# Patient Record
Sex: MALE | Race: WHITE | NOT HISPANIC OR LATINO | Employment: OTHER | URBAN - METROPOLITAN AREA
[De-identification: names, ages, dates, MRNs, and addresses within clinical notes are randomized per-mention and may not be internally consistent; named-entity substitution may affect disease eponyms.]

---

## 2017-03-24 RX ORDER — ATORVASTATIN CALCIUM 20 MG/1
TABLET, FILM COATED ORAL
Qty: 90 TABLET | Refills: 0 | Status: SHIPPED | OUTPATIENT
Start: 2017-03-24 | End: 2017-07-15 | Stop reason: SDUPTHER

## 2017-04-25 DIAGNOSIS — E78.5 HYPERLIPIDEMIA, UNSPECIFIED HYPERLIPIDEMIA TYPE: Primary | ICD-10-CM

## 2017-04-25 DIAGNOSIS — R73.9 HYPERGLYCEMIA: ICD-10-CM

## 2017-05-01 LAB
ALBUMIN SERPL-MCNC: 4.4 G/DL (ref 3.5–5.2)
ALBUMIN/GLOB SERPL: 1.7 G/DL
ALP SERPL-CCNC: 132 U/L (ref 39–117)
ALT SERPL-CCNC: 23 U/L (ref 1–41)
AST SERPL-CCNC: 20 U/L (ref 1–40)
BILIRUB SERPL-MCNC: 0.4 MG/DL (ref 0.1–1.2)
BUN SERPL-MCNC: 15 MG/DL (ref 6–20)
BUN/CREAT SERPL: 15 (ref 7–25)
CALCIUM SERPL-MCNC: 9.7 MG/DL (ref 8.6–10.5)
CHLORIDE SERPL-SCNC: 101 MMOL/L (ref 98–107)
CHOLEST SERPL-MCNC: 137 MG/DL (ref 0–200)
CO2 SERPL-SCNC: 24.4 MMOL/L (ref 22–29)
CREAT SERPL-MCNC: 1 MG/DL (ref 0.76–1.27)
GLOBULIN SER CALC-MCNC: 2.6 GM/DL
GLUCOSE SERPL-MCNC: 153 MG/DL (ref 65–99)
HBA1C MFR BLD: 5.44 % (ref 4.8–5.6)
HDLC SERPL-MCNC: 38 MG/DL (ref 40–60)
LDLC SERPL CALC-MCNC: 70 MG/DL (ref 0–100)
LDLC/HDLC SERPL: 1.83 {RATIO}
POTASSIUM SERPL-SCNC: 4.5 MMOL/L (ref 3.5–5.2)
PROT SERPL-MCNC: 7 G/DL (ref 6–8.5)
SODIUM SERPL-SCNC: 140 MMOL/L (ref 136–145)
TRIGL SERPL-MCNC: 147 MG/DL (ref 0–150)
VLDLC SERPL CALC-MCNC: 29.4 MG/DL (ref 5–40)

## 2017-05-08 ENCOUNTER — OFFICE VISIT (OUTPATIENT)
Dept: FAMILY MEDICINE CLINIC | Facility: CLINIC | Age: 57
End: 2017-05-08

## 2017-05-08 VITALS
OXYGEN SATURATION: 99 % | BODY MASS INDEX: 27.2 KG/M2 | WEIGHT: 190 LBS | DIASTOLIC BLOOD PRESSURE: 68 MMHG | TEMPERATURE: 98.2 F | SYSTOLIC BLOOD PRESSURE: 112 MMHG | HEIGHT: 70 IN | HEART RATE: 61 BPM

## 2017-05-08 DIAGNOSIS — E78.5 HYPERLIPIDEMIA, UNSPECIFIED HYPERLIPIDEMIA TYPE: Primary | ICD-10-CM

## 2017-05-08 PROCEDURE — 99213 OFFICE O/P EST LOW 20 MIN: CPT | Performed by: INTERNAL MEDICINE

## 2017-07-17 RX ORDER — ATORVASTATIN CALCIUM 20 MG/1
TABLET, FILM COATED ORAL
Qty: 90 TABLET | Refills: 0 | Status: SHIPPED | OUTPATIENT
Start: 2017-07-17 | End: 2017-10-20 | Stop reason: SDUPTHER

## 2017-10-20 RX ORDER — ATORVASTATIN CALCIUM 20 MG/1
TABLET, FILM COATED ORAL
Qty: 90 TABLET | Refills: 0 | Status: SHIPPED | OUTPATIENT
Start: 2017-10-20 | End: 2018-05-08 | Stop reason: SDUPTHER

## 2017-11-01 DIAGNOSIS — E78.5 HYPERLIPIDEMIA, UNSPECIFIED HYPERLIPIDEMIA TYPE: Primary | ICD-10-CM

## 2017-11-06 ENCOUNTER — TELEPHONE (OUTPATIENT)
Dept: FAMILY MEDICINE CLINIC | Facility: CLINIC | Age: 57
End: 2017-11-06

## 2017-11-06 NOTE — TELEPHONE ENCOUNTER
SPOKE TO PATIENT AND INFORMED HIM THAT THE LAB WORK COULD NOT BE ADDED SINCE I DID NOT HAVE A DIAGNOSIS, UT THAT WE COULD ADD IT TO HIS BLOOD WORK ONCE HE IS SEEN AND DR. GONZALES WANTS TO ORDER IT.  HE EXPRESSED UNDERSTANDING   ----- Message from Kike Elias sent at 11/3/2017  3:06 PM EDT -----  PT IS SCHEDULED FOR LABS ON 11/7/17 AND IS REQUESTING THE  ADD HORMONE LEVEL CHECK TO HIS LAB ORDER.  PLEASE ADD TO ORDER OR CONTACT PT TO ADVISE [phone]

## 2017-11-07 LAB
ALBUMIN SERPL-MCNC: 4.6 G/DL (ref 3.5–5.2)
ALBUMIN/GLOB SERPL: 1.8 G/DL
ALP SERPL-CCNC: 119 U/L (ref 39–117)
ALT SERPL-CCNC: 30 U/L (ref 1–41)
AST SERPL-CCNC: 28 U/L (ref 1–40)
BILIRUB SERPL-MCNC: 0.5 MG/DL (ref 0.1–1.2)
BUN SERPL-MCNC: 14 MG/DL (ref 6–20)
BUN/CREAT SERPL: 13 (ref 7–25)
CALCIUM SERPL-MCNC: 9.6 MG/DL (ref 8.6–10.5)
CHLORIDE SERPL-SCNC: 102 MMOL/L (ref 98–107)
CHOLEST SERPL-MCNC: 147 MG/DL (ref 0–200)
CO2 SERPL-SCNC: 27.3 MMOL/L (ref 22–29)
CREAT SERPL-MCNC: 1.08 MG/DL (ref 0.76–1.27)
GFR SERPLBLD CREATININE-BSD FMLA CKD-EPI: 70 ML/MIN/1.73
GFR SERPLBLD CREATININE-BSD FMLA CKD-EPI: 85 ML/MIN/1.73
GLOBULIN SER CALC-MCNC: 2.6 GM/DL
GLUCOSE SERPL-MCNC: 107 MG/DL (ref 65–99)
HDLC SERPL-MCNC: 36 MG/DL (ref 40–60)
LDLC SERPL CALC-MCNC: 67 MG/DL (ref 0–100)
LDLC/HDLC SERPL: 1.85 {RATIO}
POTASSIUM SERPL-SCNC: 4.6 MMOL/L (ref 3.5–5.2)
PROT SERPL-MCNC: 7.2 G/DL (ref 6–8.5)
SODIUM SERPL-SCNC: 142 MMOL/L (ref 136–145)
TRIGL SERPL-MCNC: 222 MG/DL (ref 0–150)
VLDLC SERPL CALC-MCNC: 44.4 MG/DL (ref 5–40)

## 2017-11-14 ENCOUNTER — OFFICE VISIT (OUTPATIENT)
Dept: FAMILY MEDICINE CLINIC | Facility: CLINIC | Age: 57
End: 2017-11-14

## 2017-11-14 VITALS
DIASTOLIC BLOOD PRESSURE: 70 MMHG | HEART RATE: 62 BPM | BODY MASS INDEX: 27.35 KG/M2 | SYSTOLIC BLOOD PRESSURE: 120 MMHG | TEMPERATURE: 97.9 F | WEIGHT: 191 LBS | HEIGHT: 70 IN | OXYGEN SATURATION: 99 %

## 2017-11-14 DIAGNOSIS — E78.5 HYPERLIPIDEMIA, UNSPECIFIED HYPERLIPIDEMIA TYPE: ICD-10-CM

## 2017-11-14 DIAGNOSIS — M77.11 LATERAL EPICONDYLITIS OF BOTH ELBOWS: ICD-10-CM

## 2017-11-14 DIAGNOSIS — M77.12 LATERAL EPICONDYLITIS OF BOTH ELBOWS: ICD-10-CM

## 2017-11-14 DIAGNOSIS — R73.9 HYPERGLYCEMIA: ICD-10-CM

## 2017-11-14 DIAGNOSIS — E66.3 OVERWEIGHT: Primary | ICD-10-CM

## 2017-11-14 PROCEDURE — 90686 IIV4 VACC NO PRSV 0.5 ML IM: CPT | Performed by: INTERNAL MEDICINE

## 2017-11-14 PROCEDURE — 99213 OFFICE O/P EST LOW 20 MIN: CPT | Performed by: INTERNAL MEDICINE

## 2017-11-14 PROCEDURE — 90471 IMMUNIZATION ADMIN: CPT | Performed by: INTERNAL MEDICINE

## 2017-11-14 RX ORDER — MELOXICAM 15 MG/1
15 TABLET ORAL DAILY
Qty: 30 TABLET | Refills: 1 | Status: SHIPPED | OUTPATIENT
Start: 2017-11-14 | End: 2018-06-07

## 2017-11-14 NOTE — PROGRESS NOTES
Subjective   Cyrus Landry is a 57 y.o. male. Patient is here today for   Chief Complaint   Patient presents with   • Hyperlipidemia     lab follow up          Vitals:    11/14/17 0757   BP: 120/70   Pulse: 62   Temp: 97.9 °F (36.6 °C)   SpO2: 99%       Past Medical History:   Diagnosis Date   • Gastric reflux    • Hyperlipidemia    • Inguinal hernia       No Known Allergies   Social History     Social History   • Marital status:      Spouse name: N/A   • Number of children: N/A   • Years of education: N/A     Occupational History   • Not on file.     Social History Main Topics   • Smoking status: Never Smoker   • Smokeless tobacco: Never Used   • Alcohol use No   • Drug use: No   • Sexual activity: Defer     Other Topics Concern   • Not on file     Social History Narrative        Current Outpatient Prescriptions:   •  atorvastatin (LIPITOR) 20 MG tablet, TAKE ONE TABLET BY MOUTH ONCE DAILY, Disp: 90 tablet, Rfl: 0  •  Multiple Vitamins-Minerals (MULTIVITAMIN ADULT) tablet, Take 1 tablet by mouth daily., Disp: , Rfl:   •  meloxicam (MOBIC) 15 MG tablet, Take 1 tablet by mouth Daily., Disp: 30 tablet, Rfl: 1     Objective     HPI Comments: He is here to follow-up on his hypercholesterolemia.    He complains of bilateral lateral epicondylitis.    Hyperlipidemia          Review of Systems   Constitutional: Negative.    HENT: Negative.    Respiratory: Negative.    Cardiovascular: Negative.    Musculoskeletal: Negative.    Psychiatric/Behavioral: Negative.        Physical Exam   Constitutional: He is oriented to person, place, and time. He appears well-developed and well-nourished.   Pleasant, groomed, BMI 27.   HENT:   Head: Normocephalic and atraumatic.   Cardiovascular: Normal rate.    Pulmonary/Chest: Effort normal.   Musculoskeletal:   He has some pain on palpation of the lateral epicondyles.  I asked him to try take an anti-inflammatory medication, meloxicam 15 mg daily when necessary.  He may use tennis  elbow arm bands as needed.    If his symptoms don't improve, he will let me know, and I will refer him to physical therapy.   Neurological: He is alert and oriented to person, place, and time.   Psychiatric: He has a normal mood and affect. His behavior is normal.   Nursing note and vitals reviewed.        Problem List Items Addressed This Visit        Cardiovascular and Mediastinum    Hyperlipidemia       Musculoskeletal and Integument    Lateral epicondylitis of both elbows       Other    Hyperglycemia    Overweight - Primary            PLAN  His hypercholesterolemia is wearing well controlled.  His LDL cholesterol is less than 70.  His target is less than 100.    His triglycerides are bit elevated, he has a BMI of 27, his fasting glucose bit elevated.  He is prediabetic.  I encouraged him to do his best to lose weight via regular aerobic activity decreased caloric intake.  Meloxicam 50 mg daily when necessary.  He'll it me know if his lateral epicondyles pain fails to improve.  I asked him to follow-up in about 6 months for a comprehensive physical exam.  No Follow-up on file.

## 2017-12-18 RX ORDER — VALACYCLOVIR HYDROCHLORIDE 1 G/1
1000 TABLET, FILM COATED ORAL 2 TIMES DAILY
Qty: 12 TABLET | Refills: 0 | Status: SHIPPED | OUTPATIENT
Start: 2017-12-18 | End: 2019-02-20 | Stop reason: SDUPTHER

## 2018-05-08 RX ORDER — ATORVASTATIN CALCIUM 20 MG/1
TABLET, FILM COATED ORAL
Qty: 90 TABLET | Refills: 0 | Status: SHIPPED | OUTPATIENT
Start: 2018-05-08 | End: 2018-08-23 | Stop reason: SDUPTHER

## 2018-05-09 DIAGNOSIS — Z00.00 ROUTINE HEALTH MAINTENANCE: Primary | ICD-10-CM

## 2018-05-09 DIAGNOSIS — R73.9 HYPERGLYCEMIA: ICD-10-CM

## 2018-05-09 DIAGNOSIS — Z11.59 NEED FOR HEPATITIS C SCREENING TEST: ICD-10-CM

## 2018-05-09 DIAGNOSIS — Z12.5 SCREENING PSA (PROSTATE SPECIFIC ANTIGEN): ICD-10-CM

## 2018-05-21 ENCOUNTER — CLINICAL SUPPORT (OUTPATIENT)
Dept: FAMILY MEDICINE CLINIC | Facility: CLINIC | Age: 58
End: 2018-05-21

## 2018-05-21 DIAGNOSIS — Z00.00 ROUTINE HEALTH MAINTENANCE: Primary | ICD-10-CM

## 2018-05-21 PROCEDURE — 93000 ELECTROCARDIOGRAM COMPLETE: CPT | Performed by: INTERNAL MEDICINE

## 2018-05-21 PROCEDURE — 85025 COMPLETE CBC W/AUTO DIFF WBC: CPT | Performed by: INTERNAL MEDICINE

## 2018-05-21 PROCEDURE — 71046 X-RAY EXAM CHEST 2 VIEWS: CPT | Performed by: INTERNAL MEDICINE

## 2018-05-22 LAB
ALBUMIN SERPL-MCNC: 4.6 G/DL (ref 3.5–5.2)
ALBUMIN/GLOB SERPL: 1.7 G/DL
ALP SERPL-CCNC: 138 U/L (ref 39–117)
ALT SERPL-CCNC: 32 U/L (ref 1–41)
APPEARANCE UR: CLEAR
AST SERPL-CCNC: 25 U/L (ref 1–40)
BILIRUB SERPL-MCNC: 0.4 MG/DL (ref 0.1–1.2)
BILIRUB UR QL STRIP: NEGATIVE
BUN SERPL-MCNC: 14 MG/DL (ref 6–20)
BUN/CREAT SERPL: 13 (ref 7–25)
CALCIUM SERPL-MCNC: 10.1 MG/DL (ref 8.6–10.5)
CHLORIDE SERPL-SCNC: 103 MMOL/L (ref 98–107)
CHOLEST SERPL-MCNC: 166 MG/DL (ref 0–200)
CO2 SERPL-SCNC: 26.7 MMOL/L (ref 22–29)
COLOR UR: YELLOW
CREAT SERPL-MCNC: 1.08 MG/DL (ref 0.76–1.27)
GFR SERPLBLD CREATININE-BSD FMLA CKD-EPI: 70 ML/MIN/1.73
GFR SERPLBLD CREATININE-BSD FMLA CKD-EPI: 85 ML/MIN/1.73
GLOBULIN SER CALC-MCNC: 2.7 GM/DL
GLUCOSE SERPL-MCNC: 103 MG/DL (ref 65–99)
GLUCOSE UR QL: NEGATIVE
HBA1C MFR BLD: 5.98 % (ref 4.8–5.6)
HCV AB S/CO SERPL IA: <0.1 S/CO RATIO (ref 0–0.9)
HCV AB SERPL QL IA: NORMAL
HDLC SERPL-MCNC: 42 MG/DL (ref 40–60)
HGB UR QL STRIP: NEGATIVE
KETONES UR QL STRIP: NEGATIVE
LDLC SERPL CALC-MCNC: 82 MG/DL (ref 0–100)
LDLC/HDLC SERPL: 1.95 {RATIO}
LEUKOCYTE ESTERASE UR QL STRIP: NEGATIVE
NITRITE UR QL STRIP: NEGATIVE
PH UR STRIP: 6.5 [PH] (ref 5–8)
POTASSIUM SERPL-SCNC: 5.4 MMOL/L (ref 3.5–5.2)
PROT SERPL-MCNC: 7.3 G/DL (ref 6–8.5)
PROT UR QL STRIP: NEGATIVE
PSA SERPL-MCNC: 0.73 NG/ML (ref 0–4)
SODIUM SERPL-SCNC: 143 MMOL/L (ref 136–145)
SP GR UR: 1.01 (ref 1–1.03)
TRIGL SERPL-MCNC: 211 MG/DL (ref 0–150)
UROBILINOGEN UR STRIP-MCNC: NORMAL MG/DL
VLDLC SERPL CALC-MCNC: 42.2 MG/DL (ref 5–40)

## 2018-06-07 ENCOUNTER — OFFICE VISIT (OUTPATIENT)
Dept: FAMILY MEDICINE CLINIC | Facility: CLINIC | Age: 58
End: 2018-06-07

## 2018-06-07 VITALS
TEMPERATURE: 97.3 F | OXYGEN SATURATION: 97 % | BODY MASS INDEX: 28.85 KG/M2 | SYSTOLIC BLOOD PRESSURE: 110 MMHG | DIASTOLIC BLOOD PRESSURE: 70 MMHG | WEIGHT: 194.8 LBS | HEART RATE: 72 BPM | HEIGHT: 69 IN

## 2018-06-07 DIAGNOSIS — Z00.00 ANNUAL PHYSICAL EXAM: Primary | ICD-10-CM

## 2018-06-07 DIAGNOSIS — R73.9 HYPERGLYCEMIA: ICD-10-CM

## 2018-06-07 DIAGNOSIS — E78.5 HYPERLIPIDEMIA, UNSPECIFIED HYPERLIPIDEMIA TYPE: ICD-10-CM

## 2018-06-07 DIAGNOSIS — E66.3 OVERWEIGHT: ICD-10-CM

## 2018-06-07 DIAGNOSIS — Z12.11 ENCOUNTER FOR SCREENING COLONOSCOPY: ICD-10-CM

## 2018-06-07 DIAGNOSIS — Z00.00 WELL ADULT EXAM: ICD-10-CM

## 2018-06-07 PROCEDURE — 99396 PREV VISIT EST AGE 40-64: CPT | Performed by: INTERNAL MEDICINE

## 2018-06-10 NOTE — PROGRESS NOTES
Subjective   Cyrus Landry is a 57 y.o. male. Patient is here today for   Chief Complaint   Patient presents with   • Annual Exam          Vitals:    06/07/18 1440   BP: 110/70   Pulse: 72   Temp: 97.3 °F (36.3 °C)   SpO2: 97%       The following portions of the patient's history were reviewed and updated as appropriate: allergies, current medications, past family history, past medical history, past social history, past surgical history and problem list.    Past Medical History:   Diagnosis Date   • Gastric reflux    • Hyperlipidemia    • Inguinal hernia       No Known Allergies   Social History     Social History   • Marital status:      Spouse name: N/A   • Number of children: N/A   • Years of education: N/A     Occupational History   • Not on file.     Social History Main Topics   • Smoking status: Never Smoker   • Smokeless tobacco: Never Used   • Alcohol use No   • Drug use: No   • Sexual activity: Defer     Other Topics Concern   • Not on file     Social History Narrative   • No narrative on file        Current Outpatient Prescriptions:   •  atorvastatin (LIPITOR) 20 MG tablet, TAKE ONE TABLET BY MOUTH ONCE DAILY, Disp: 90 tablet, Rfl: 0  •  Multiple Vitamins-Minerals (MULTIVITAMIN ADULT) tablet, Take 1 tablet by mouth daily., Disp: , Rfl:   •  valACYclovir (VALTREX) 1000 MG tablet, Take 1 tablet by mouth 2 (Two) Times a Day., Disp: 12 tablet, Rfl: 0     EKG  ECG Report  Echocardiogram showed normal sinus rhythm with regular rate.    PA and lateral chest x-ray show no acute or chronic changes.  Objective   Is here today for a comprehensive physical exam.       Cyrus Landry 57 y.o. male who presents for an Annual Wellness Visit.  he has a history of   Patient Active Problem List   Diagnosis   • Hyperlipidemia   • Well adult exam   • Hyperglycemia   • Overweight   • Lateral epicondylitis of both elbows   .  he has been doing well with new interval problems.  Labs results discussed in detail with the  patient.  Plan to update vaccines if needed today.        Lab Results (most recent)     None            Review of Systems   Constitutional: Negative.    HENT: Negative.    Eyes: Negative.    Respiratory: Negative.    Cardiovascular: Negative.    Gastrointestinal: Negative.    Endocrine: Negative.    Genitourinary: Negative.    Musculoskeletal: Negative.    Skin: Negative.    Allergic/Immunologic: Negative.    Neurological: Negative.    Hematological: Negative.    Psychiatric/Behavioral: Negative.        Physical Exam   Constitutional: He is oriented to person, place, and time. He appears well-developed and well-nourished. No distress.   Pleasant, neatly groomed, BMI 28.   HENT:   Head: Normocephalic and atraumatic.   Right Ear: External ear normal.   Left Ear: External ear normal.   Nose: Nose normal.   Mouth/Throat: Oropharynx is clear and moist. No oropharyngeal exudate.   Eyes: Conjunctivae and EOM are normal. Pupils are equal, round, and reactive to light. Right eye exhibits no discharge. Left eye exhibits no discharge. No scleral icterus.   Neck: Normal range of motion. Neck supple. No JVD present. No tracheal deviation present. No thyromegaly present.   Cardiovascular: Normal rate, regular rhythm, normal heart sounds and intact distal pulses.  Exam reveals no gallop and no friction rub.    No murmur heard.  Pulmonary/Chest: Effort normal and breath sounds normal. No stridor. No respiratory distress. He has no wheezes. He has no rales. He exhibits no tenderness.   Abdominal: Soft. Bowel sounds are normal. He exhibits no distension and no mass. There is no tenderness. There is no rebound and no guarding. No hernia.   Genitourinary: Rectum normal, prostate normal and penis normal. Rectal exam shows guaiac negative stool. No penile tenderness.   Musculoskeletal: Normal range of motion. He exhibits no edema, tenderness or deformity.   Lymphadenopathy:     He has no cervical adenopathy.   Neurological: He is alert  and oriented to person, place, and time. He displays normal reflexes. No cranial nerve deficit or sensory deficit. He exhibits normal muscle tone. Coordination normal.   Skin: Skin is warm and dry. Capillary refill takes less than 2 seconds. No rash noted. He is not diaphoretic. No erythema. No pallor.   Psychiatric: He has a normal mood and affect. His behavior is normal. Thought content normal.   Nursing note and vitals reviewed.      ASSESSMENT       Problem List Items Addressed This Visit        Cardiovascular and Mediastinum    Hyperlipidemia       Other    Well adult exam    Hyperglycemia    Overweight      Other Visit Diagnoses     Annual physical exam    -  Primary    Relevant Orders    XR Chest PA & Lateral          PLANThere are no Patient Instructions on file for this visit. he is overweight and has hyperglycemia as well as a moderately elevated triglyceride.  Weight loss regular aerobic activity with prolonged way towards helping correct these issues and work development of type 2 diabetes in the future.    His hypercholesterolemia is well-controlled on atorvastatin 20 mg daily.    I asked him to follow-up in about 6 months.  Prior to that visit, he should have following labs: Lipid profile, comprehensive metabolic panel, hemoglobin A1c,    I have referred him to Gen. surgery for a colonoscopy.  He has a history of polyps and is overdue for another colonoscopy.      No Follow-up on file.

## 2018-08-23 RX ORDER — ATORVASTATIN CALCIUM 20 MG/1
20 TABLET, FILM COATED ORAL DAILY
Qty: 90 TABLET | Refills: 2 | Status: SHIPPED | OUTPATIENT
Start: 2018-08-23 | End: 2018-08-29 | Stop reason: SDUPTHER

## 2018-08-29 RX ORDER — ATORVASTATIN CALCIUM 20 MG/1
20 TABLET, FILM COATED ORAL DAILY
Qty: 90 TABLET | Refills: 2 | Status: SHIPPED | OUTPATIENT
Start: 2018-08-29 | End: 2019-11-01 | Stop reason: SDUPTHER

## 2018-11-30 DIAGNOSIS — R73.9 HYPERGLYCEMIA: ICD-10-CM

## 2018-11-30 DIAGNOSIS — E78.5 HYPERLIPIDEMIA, UNSPECIFIED HYPERLIPIDEMIA TYPE: Primary | ICD-10-CM

## 2018-12-04 LAB
ALBUMIN SERPL-MCNC: 4.5 G/DL (ref 3.5–5.2)
ALBUMIN/GLOB SERPL: 1.7 G/DL
ALP SERPL-CCNC: 141 U/L (ref 39–117)
ALT SERPL-CCNC: 29 U/L (ref 1–41)
AST SERPL-CCNC: 25 U/L (ref 1–40)
BASOPHILS # BLD AUTO: 0.03 10*3/MM3 (ref 0–0.2)
BASOPHILS NFR BLD AUTO: 0.4 % (ref 0–1.5)
BILIRUB SERPL-MCNC: 0.4 MG/DL (ref 0.1–1.2)
BUN SERPL-MCNC: 11 MG/DL (ref 8–23)
BUN/CREAT SERPL: 10.1 (ref 7–25)
CALCIUM SERPL-MCNC: 9.9 MG/DL (ref 8.6–10.5)
CHLORIDE SERPL-SCNC: 103 MMOL/L (ref 98–107)
CHOLEST SERPL-MCNC: 151 MG/DL (ref 0–200)
CO2 SERPL-SCNC: 28.2 MMOL/L (ref 22–29)
CREAT SERPL-MCNC: 1.09 MG/DL (ref 0.76–1.27)
EOSINOPHIL # BLD AUTO: 0.25 10*3/MM3 (ref 0–0.7)
EOSINOPHIL NFR BLD AUTO: 3.6 % (ref 0.3–6.2)
ERYTHROCYTE [DISTWIDTH] IN BLOOD BY AUTOMATED COUNT: 13.5 % (ref 11.5–14.5)
GLOBULIN SER CALC-MCNC: 2.6 GM/DL
GLUCOSE SERPL-MCNC: 117 MG/DL (ref 65–99)
HBA1C MFR BLD: 5.5 % (ref 4.8–5.6)
HCT VFR BLD AUTO: 45.8 % (ref 40.4–52.2)
HDLC SERPL-MCNC: 39 MG/DL (ref 40–60)
HGB BLD-MCNC: 15.5 G/DL (ref 13.7–17.6)
IMM GRANULOCYTES # BLD: 0.01 10*3/MM3 (ref 0–0.03)
IMM GRANULOCYTES NFR BLD: 0.1 % (ref 0–0.5)
LDLC SERPL CALC-MCNC: 70 MG/DL (ref 0–100)
LDLC/HDLC SERPL: 1.81 {RATIO}
LYMPHOCYTES # BLD AUTO: 2.5 10*3/MM3 (ref 0.9–4.8)
LYMPHOCYTES NFR BLD AUTO: 36.4 % (ref 19.6–45.3)
MCH RBC QN AUTO: 30.6 PG (ref 27–32.7)
MCHC RBC AUTO-ENTMCNC: 33.8 G/DL (ref 32.6–36.4)
MCV RBC AUTO: 90.5 FL (ref 79.8–96.2)
MONOCYTES # BLD AUTO: 0.62 10*3/MM3 (ref 0.2–1.2)
MONOCYTES NFR BLD AUTO: 9 % (ref 5–12)
NEUTROPHILS # BLD AUTO: 3.46 10*3/MM3 (ref 1.9–8.1)
NEUTROPHILS NFR BLD AUTO: 50.6 % (ref 42.7–76)
PLATELET # BLD AUTO: 198 10*3/MM3 (ref 140–500)
POTASSIUM SERPL-SCNC: 4.7 MMOL/L (ref 3.5–5.2)
PROT SERPL-MCNC: 7.1 G/DL (ref 6–8.5)
RBC # BLD AUTO: 5.06 10*6/MM3 (ref 4.6–6)
SODIUM SERPL-SCNC: 142 MMOL/L (ref 136–145)
TRIGL SERPL-MCNC: 208 MG/DL (ref 0–150)
VLDLC SERPL CALC-MCNC: 41.6 MG/DL (ref 5–40)
WBC # BLD AUTO: 6.86 10*3/MM3 (ref 4.5–10.7)

## 2018-12-11 ENCOUNTER — OFFICE VISIT (OUTPATIENT)
Dept: FAMILY MEDICINE CLINIC | Facility: CLINIC | Age: 58
End: 2018-12-11

## 2018-12-11 VITALS
DIASTOLIC BLOOD PRESSURE: 72 MMHG | TEMPERATURE: 97.9 F | HEIGHT: 69 IN | SYSTOLIC BLOOD PRESSURE: 126 MMHG | BODY MASS INDEX: 29 KG/M2 | OXYGEN SATURATION: 98 % | HEART RATE: 57 BPM | RESPIRATION RATE: 16 BRPM | WEIGHT: 195.8 LBS

## 2018-12-11 DIAGNOSIS — E66.3 OVERWEIGHT: ICD-10-CM

## 2018-12-11 DIAGNOSIS — R73.9 HYPERGLYCEMIA: ICD-10-CM

## 2018-12-11 DIAGNOSIS — E78.5 HYPERLIPIDEMIA, UNSPECIFIED HYPERLIPIDEMIA TYPE: Primary | ICD-10-CM

## 2018-12-11 PROCEDURE — 99213 OFFICE O/P EST LOW 20 MIN: CPT | Performed by: INTERNAL MEDICINE

## 2018-12-11 NOTE — PROGRESS NOTES
Subjective   Cyrus Landry is a 58 y.o. male. Patient is here today for   Chief Complaint   Patient presents with   • Follow-up     hld           Vitals:    12/11/18 0806   BP: 126/72   Pulse: 57   Resp: 16   Temp: 97.9 °F (36.6 °C)   SpO2: 98%       Past Medical History:   Diagnosis Date   • Gastric reflux    • Hyperlipidemia    • Inguinal hernia       No Known Allergies   Social History     Socioeconomic History   • Marital status:      Spouse name: Not on file   • Number of children: Not on file   • Years of education: Not on file   • Highest education level: Not on file   Social Needs   • Financial resource strain: Not on file   • Food insecurity - worry: Not on file   • Food insecurity - inability: Not on file   • Transportation needs - medical: Not on file   • Transportation needs - non-medical: Not on file   Occupational History   • Not on file   Tobacco Use   • Smoking status: Never Smoker   • Smokeless tobacco: Never Used   Substance and Sexual Activity   • Alcohol use: No   • Drug use: No   • Sexual activity: Defer   Other Topics Concern   • Not on file   Social History Narrative   • Not on file        Current Outpatient Medications:   •  atorvastatin (LIPITOR) 20 MG tablet, Take 1 tablet by mouth Daily., Disp: 90 tablet, Rfl: 2  •  Multiple Vitamins-Minerals (MULTIVITAMIN ADULT) tablet, Take 1 tablet by mouth daily., Disp: , Rfl:   •  valACYclovir (VALTREX) 1000 MG tablet, Take 1 tablet by mouth 2 (Two) Times a Day., Disp: 12 tablet, Rfl: 0     Objective     He is here follow-up on hypercholesterolemia and hyperglycemia.    He has no complaints.         Review of Systems   Constitutional: Negative.    HENT: Negative.    Respiratory: Negative.    Cardiovascular: Negative.    Psychiatric/Behavioral: Negative.        Physical Exam   Constitutional: He is oriented to person, place, and time. He appears well-developed and well-nourished.   HENT:   Head: Normocephalic and atraumatic.   Pulmonary/Chest:  Effort normal.   Neurological: He is alert and oriented to person, place, and time.   Psychiatric: He has a normal mood and affect. His behavior is normal.   Nursing note and vitals reviewed.        Problem List Items Addressed This Visit        Cardiovascular and Mediastinum    Hyperlipidemia - Primary       Other    Hyperglycemia    Overweight            PLAN    He and I discussed this lab work from last week.  His hypercholesterolemia is well-controlled.    He does have an elevated triglyceride which feels hand-in-hand with his elevated fasting glucose.  He is prediabetic.  I've urged him to do his best to lose weight and get regular aerobic activity.    He refused a flu vaccine today.    Follow-up in 6 months.    Follow-up for a comprehensive physical exam once yearly.  No Follow-up on file.

## 2019-02-20 ENCOUNTER — TELEPHONE (OUTPATIENT)
Dept: FAMILY MEDICINE CLINIC | Facility: CLINIC | Age: 59
End: 2019-02-20

## 2019-02-20 RX ORDER — VALACYCLOVIR HYDROCHLORIDE 1 G/1
1000 TABLET, FILM COATED ORAL 2 TIMES DAILY
Qty: 12 TABLET | Refills: 0 | Status: SHIPPED | OUTPATIENT
Start: 2019-02-20 | End: 2019-08-19 | Stop reason: SDUPTHER

## 2019-02-20 NOTE — TELEPHONE ENCOUNTER
COMPLETED    ----- Message from Kike Elias sent at 2/20/2019 11:39 AM EST -----  PT NEEDS SCRIPT REFILL FOR valACYclovir (VALTREX) 1000 MG Take 1 tablet by mouth 2 (Two) Times a Day #12    PLEASE SEND TO [facility] ON [address].  IF YOU HAVE ANY QUESTIONS PLEASE CONTACT PT [phone]

## 2019-06-07 DIAGNOSIS — R73.9 HYPERGLYCEMIA: ICD-10-CM

## 2019-06-07 DIAGNOSIS — E78.5 HYPERLIPIDEMIA, UNSPECIFIED HYPERLIPIDEMIA TYPE: Primary | ICD-10-CM

## 2019-06-12 LAB
ALBUMIN SERPL-MCNC: 4.4 G/DL (ref 3.5–5.2)
ALBUMIN/GLOB SERPL: 1.6 G/DL
ALP SERPL-CCNC: 121 U/L (ref 39–117)
ALT SERPL-CCNC: 35 U/L (ref 1–41)
APPEARANCE UR: CLEAR
AST SERPL-CCNC: 25 U/L (ref 1–40)
BACTERIA #/AREA URNS HPF: NORMAL /HPF
BASOPHILS # BLD AUTO: 0.09 10*3/MM3 (ref 0–0.2)
BASOPHILS NFR BLD AUTO: 1.3 % (ref 0–1.5)
BILIRUB SERPL-MCNC: 0.4 MG/DL (ref 0.2–1.2)
BILIRUB UR QL STRIP: NEGATIVE
BUN SERPL-MCNC: 13 MG/DL (ref 8–23)
BUN/CREAT SERPL: 13.7 (ref 7–25)
CALCIUM SERPL-MCNC: 9.8 MG/DL (ref 8.6–10.5)
CASTS URNS MICRO: NORMAL
CHLORIDE SERPL-SCNC: 104 MMOL/L (ref 98–107)
CHOLEST SERPL-MCNC: 178 MG/DL (ref 0–200)
CO2 SERPL-SCNC: 27.3 MMOL/L (ref 22–29)
COLOR UR: YELLOW
CREAT SERPL-MCNC: 0.95 MG/DL (ref 0.76–1.27)
EOSINOPHIL # BLD AUTO: 0.2 10*3/MM3 (ref 0–0.4)
EOSINOPHIL NFR BLD AUTO: 2.9 % (ref 0.3–6.2)
EPI CELLS #/AREA URNS HPF: NORMAL /HPF
ERYTHROCYTE [DISTWIDTH] IN BLOOD BY AUTOMATED COUNT: 13 % (ref 12.3–15.4)
GLOBULIN SER CALC-MCNC: 2.7 GM/DL
GLUCOSE SERPL-MCNC: 107 MG/DL (ref 65–99)
GLUCOSE UR QL: NEGATIVE
HBA1C MFR BLD: 5.71 % (ref 4.8–5.6)
HCT VFR BLD AUTO: 46.5 % (ref 37.5–51)
HDLC SERPL-MCNC: 39 MG/DL (ref 40–60)
HGB BLD-MCNC: 14.7 G/DL (ref 13–17.7)
HGB UR QL STRIP: NEGATIVE
IMM GRANULOCYTES # BLD AUTO: 0.01 10*3/MM3 (ref 0–0.05)
IMM GRANULOCYTES NFR BLD AUTO: 0.1 % (ref 0–0.5)
KETONES UR QL STRIP: NEGATIVE
LDLC SERPL CALC-MCNC: 84 MG/DL (ref 0–100)
LDLC/HDLC SERPL: 2.15 {RATIO}
LEUKOCYTE ESTERASE UR QL STRIP: NEGATIVE
LYMPHOCYTES # BLD AUTO: 1.76 10*3/MM3 (ref 0.7–3.1)
LYMPHOCYTES NFR BLD AUTO: 25.6 % (ref 19.6–45.3)
MCH RBC QN AUTO: 30.1 PG (ref 26.6–33)
MCHC RBC AUTO-ENTMCNC: 31.6 G/DL (ref 31.5–35.7)
MCV RBC AUTO: 95.1 FL (ref 79–97)
MONOCYTES # BLD AUTO: 0.59 10*3/MM3 (ref 0.1–0.9)
MONOCYTES NFR BLD AUTO: 8.6 % (ref 5–12)
NEUTROPHILS # BLD AUTO: 4.22 10*3/MM3 (ref 1.7–7)
NEUTROPHILS NFR BLD AUTO: 61.5 % (ref 42.7–76)
NITRITE UR QL STRIP: NEGATIVE
NRBC BLD AUTO-RTO: 0 /100 WBC (ref 0–0.2)
PH UR STRIP: 7 [PH] (ref 5–8)
PLATELET # BLD AUTO: 196 10*3/MM3 (ref 140–450)
POTASSIUM SERPL-SCNC: 5.1 MMOL/L (ref 3.5–5.2)
PROT SERPL-MCNC: 7.1 G/DL (ref 6–8.5)
PROT UR QL STRIP: NEGATIVE
RBC # BLD AUTO: 4.89 10*6/MM3 (ref 4.14–5.8)
RBC #/AREA URNS HPF: NORMAL /HPF
SODIUM SERPL-SCNC: 142 MMOL/L (ref 136–145)
SP GR UR: 1.02 (ref 1–1.03)
TRIGL SERPL-MCNC: 275 MG/DL (ref 0–150)
UROBILINOGEN UR STRIP-MCNC: (no result) MG/DL
VLDLC SERPL CALC-MCNC: 55 MG/DL
WBC # BLD AUTO: 6.87 10*3/MM3 (ref 3.4–10.8)
WBC #/AREA URNS HPF: NORMAL /HPF

## 2019-06-18 ENCOUNTER — OFFICE VISIT (OUTPATIENT)
Dept: FAMILY MEDICINE CLINIC | Facility: CLINIC | Age: 59
End: 2019-06-18

## 2019-06-18 VITALS
WEIGHT: 194 LBS | BODY MASS INDEX: 28.73 KG/M2 | TEMPERATURE: 97 F | HEIGHT: 69 IN | HEART RATE: 62 BPM | OXYGEN SATURATION: 97 % | RESPIRATION RATE: 16 BRPM | SYSTOLIC BLOOD PRESSURE: 122 MMHG | DIASTOLIC BLOOD PRESSURE: 70 MMHG

## 2019-06-18 DIAGNOSIS — R73.9 HYPERGLYCEMIA: ICD-10-CM

## 2019-06-18 DIAGNOSIS — E78.5 HYPERLIPIDEMIA, UNSPECIFIED HYPERLIPIDEMIA TYPE: ICD-10-CM

## 2019-06-18 DIAGNOSIS — G56.01 RIGHT CARPAL TUNNEL SYNDROME: Primary | ICD-10-CM

## 2019-06-18 PROCEDURE — 99214 OFFICE O/P EST MOD 30 MIN: CPT | Performed by: INTERNAL MEDICINE

## 2019-06-18 NOTE — PROGRESS NOTES
Subjective   Cyrus Landry is a 58 y.o. male. Patient is here today for   Chief Complaint   Patient presents with   • Hyperlipidemia          Vitals:    06/18/19 0805   BP: 122/70   Pulse: 62   Resp: 16   Temp: 97 °F (36.1 °C)   SpO2: 97%       Past Medical History:   Diagnosis Date   • Gastric reflux    • Hyperlipidemia    • Inguinal hernia       No Known Allergies   Social History     Socioeconomic History   • Marital status:      Spouse name: Not on file   • Number of children: Not on file   • Years of education: Not on file   • Highest education level: Not on file   Tobacco Use   • Smoking status: Never Smoker   • Smokeless tobacco: Never Used   Substance and Sexual Activity   • Alcohol use: No   • Drug use: No   • Sexual activity: Defer        Current Outpatient Medications:   •  atorvastatin (LIPITOR) 20 MG tablet, Take 1 tablet by mouth Daily., Disp: 90 tablet, Rfl: 2  •  Multiple Vitamins-Minerals (MULTIVITAMIN ADULT) tablet, Take 1 tablet by mouth daily., Disp: , Rfl:   •  valACYclovir (VALTREX) 1000 MG tablet, Take 1 tablet by mouth 2 (Two) Times a Day., Disp: 12 tablet, Rfl: 0     Objective     He complains of right hand pain and tingling in the digits as well as proximal to the right wrist.  He believes that he has carpal tunnel syndrome.  He would like to see a hand surgeon about this issue.    Otherwise, no complaints.         Review of Systems   Constitutional: Negative.    HENT: Negative.    Respiratory: Negative.    Cardiovascular: Negative.    Musculoskeletal:        Right hand pain as described in the HPI.   Psychiatric/Behavioral: Negative.        Physical Exam   Constitutional: He is oriented to person, place, and time. He appears well-developed and well-nourished.   HENT:   Head: Normocephalic and atraumatic.   Cardiovascular: Normal rate and regular rhythm.   Pulmonary/Chest: Effort normal and breath sounds normal.   Musculoskeletal:   He has a full range of motion in the right wrist.   Tinel's, and Phalen's negative.   Neurological: He is alert and oriented to person, place, and time.   Skin: Skin is warm and dry.   Psychiatric: He has a normal mood and affect. His behavior is normal. Thought content normal.   Nursing note and vitals reviewed.        Problem List Items Addressed This Visit        Cardiovascular and Mediastinum    Hyperlipidemia       Nervous and Auditory    Right carpal tunnel syndrome - Primary    Relevant Orders    Ambulatory Referral to Hand Surgery       Other    Hyperglycemia            PLAN  He and I reviewed his labs.  His hypercholesterolemia is well controlled.    He has hyperglycemia without type 2 diabetes.  Of encouraged him to maintain regular aerobic exercise regimen and to try to lose 10 to 15 pounds to improve his insulin resistance.    He has carpal tunnel syndrome in his right wrist.  I referred him to hand surgery to get their opinion on further treatment (steroids versus carpal tunnel release).    I asked him to follow-up in 4 to 6 months.  No Follow-up on file.

## 2019-06-19 LAB
25(OH)D3+25(OH)D2 SERPL-MCNC: 26.9 NG/ML (ref 30–100)
FOLATE SERPL-MCNC: >20 NG/ML (ref 4.78–24.2)
Lab: NORMAL
PSA SERPL-MCNC: 1.1 NG/ML (ref 0–4)
TESTOST SERPL-MCNC: 442 NG/DL (ref 264–916)
TSH SERPL DL<=0.005 MIU/L-ACNC: 3.34 MIU/ML (ref 0.27–4.2)
VIT B12 SERPL-MCNC: 627 PG/ML (ref 211–946)
WRITTEN AUTHORIZATION: NORMAL

## 2019-08-19 ENCOUNTER — TELEPHONE (OUTPATIENT)
Dept: FAMILY MEDICINE CLINIC | Facility: CLINIC | Age: 59
End: 2019-08-19

## 2019-08-19 RX ORDER — VALACYCLOVIR HYDROCHLORIDE 1 G/1
1000 TABLET, FILM COATED ORAL 2 TIMES DAILY
Qty: 12 TABLET | Refills: 0 | Status: SHIPPED | OUTPATIENT
Start: 2019-08-19 | End: 2019-09-23 | Stop reason: SDUPTHER

## 2019-08-19 NOTE — TELEPHONE ENCOUNTER
SENT OVER 12 AS ALWAYS PRESCRIBED, TO BE TAKEN PRN FOR OUTBREAK.     ----- Message from Pham Palencia sent at 8/19/2019  8:15 AM EDT -----  Contact: [phone]  REFILL    VALACYCLOVIR 1000MG  BID    90 DAY    [facility]

## 2019-09-23 RX ORDER — VALACYCLOVIR HYDROCHLORIDE 1 G/1
TABLET, FILM COATED ORAL
Qty: 12 TABLET | Refills: 2 | Status: SHIPPED | OUTPATIENT
Start: 2019-09-23 | End: 2020-06-22 | Stop reason: SDUPTHER

## 2019-11-04 RX ORDER — ATORVASTATIN CALCIUM 20 MG/1
TABLET, FILM COATED ORAL
Qty: 30 TABLET | Refills: 8 | Status: SHIPPED | OUTPATIENT
Start: 2019-11-04 | End: 2020-06-22

## 2019-12-16 DIAGNOSIS — R73.9 HYPERGLYCEMIA: Primary | ICD-10-CM

## 2019-12-16 DIAGNOSIS — E78.5 HYPERLIPIDEMIA, UNSPECIFIED HYPERLIPIDEMIA TYPE: ICD-10-CM

## 2019-12-16 DIAGNOSIS — R79.9 ABNORMAL BLOOD CHEMISTRY: ICD-10-CM

## 2019-12-17 LAB
25(OH)D3+25(OH)D2 SERPL-MCNC: 25.7 NG/ML (ref 30–100)
ALBUMIN SERPL-MCNC: 4.6 G/DL (ref 3.5–5.2)
ALBUMIN/GLOB SERPL: 1.7 G/DL
ALP SERPL-CCNC: 127 U/L (ref 39–117)
ALT SERPL-CCNC: 32 U/L (ref 1–41)
AST SERPL-CCNC: 29 U/L (ref 1–40)
BILIRUB SERPL-MCNC: 0.3 MG/DL (ref 0.2–1.2)
BUN SERPL-MCNC: 17 MG/DL (ref 8–23)
BUN/CREAT SERPL: 16 (ref 7–25)
CALCIUM SERPL-MCNC: 9.7 MG/DL (ref 8.6–10.5)
CHLORIDE SERPL-SCNC: 102 MMOL/L (ref 98–107)
CHOLEST SERPL-MCNC: 168 MG/DL (ref 0–200)
CO2 SERPL-SCNC: 28.5 MMOL/L (ref 22–29)
CREAT SERPL-MCNC: 1.06 MG/DL (ref 0.76–1.27)
GLOBULIN SER CALC-MCNC: 2.7 GM/DL
GLUCOSE SERPL-MCNC: 88 MG/DL (ref 65–99)
HBA1C MFR BLD: 6 % (ref 4.8–5.6)
HDLC SERPL-MCNC: 53 MG/DL (ref 40–60)
LDLC SERPL CALC-MCNC: 81 MG/DL (ref 0–100)
LDLC/HDLC SERPL: 1.52 {RATIO}
POTASSIUM SERPL-SCNC: 5 MMOL/L (ref 3.5–5.2)
PROT SERPL-MCNC: 7.3 G/DL (ref 6–8.5)
SODIUM SERPL-SCNC: 142 MMOL/L (ref 136–145)
TRIGL SERPL-MCNC: 172 MG/DL (ref 0–150)
VLDLC SERPL CALC-MCNC: 34.4 MG/DL

## 2019-12-20 ENCOUNTER — OFFICE VISIT (OUTPATIENT)
Dept: FAMILY MEDICINE CLINIC | Facility: CLINIC | Age: 59
End: 2019-12-20

## 2019-12-20 VITALS
OXYGEN SATURATION: 98 % | BODY MASS INDEX: 27.4 KG/M2 | WEIGHT: 185 LBS | RESPIRATION RATE: 18 BRPM | HEART RATE: 64 BPM | SYSTOLIC BLOOD PRESSURE: 112 MMHG | TEMPERATURE: 98 F | HEIGHT: 69 IN | DIASTOLIC BLOOD PRESSURE: 74 MMHG

## 2019-12-20 DIAGNOSIS — Z23 NEED FOR INFLUENZA VACCINATION: Primary | ICD-10-CM

## 2019-12-20 DIAGNOSIS — N52.9 VASCULOGENIC ERECTILE DYSFUNCTION, UNSPECIFIED VASCULOGENIC ERECTILE DYSFUNCTION TYPE: ICD-10-CM

## 2019-12-20 DIAGNOSIS — E55.9 VITAMIN D DEFICIENCY: ICD-10-CM

## 2019-12-20 DIAGNOSIS — E78.5 HYPERLIPIDEMIA, UNSPECIFIED HYPERLIPIDEMIA TYPE: ICD-10-CM

## 2019-12-20 DIAGNOSIS — R73.9 HYPERGLYCEMIA: ICD-10-CM

## 2019-12-20 PROCEDURE — 90686 IIV4 VACC NO PRSV 0.5 ML IM: CPT | Performed by: INTERNAL MEDICINE

## 2019-12-20 PROCEDURE — 99214 OFFICE O/P EST MOD 30 MIN: CPT | Performed by: INTERNAL MEDICINE

## 2019-12-20 PROCEDURE — 90471 IMMUNIZATION ADMIN: CPT | Performed by: INTERNAL MEDICINE

## 2019-12-20 RX ORDER — ERGOCALCIFEROL 1.25 MG/1
50000 CAPSULE ORAL
Qty: 5 CAPSULE | Refills: 11 | Status: SHIPPED | OUTPATIENT
Start: 2019-12-20 | End: 2021-03-12

## 2019-12-20 RX ORDER — SILDENAFIL 25 MG/1
50 TABLET, FILM COATED ORAL DAILY PRN
Qty: 20 TABLET | Refills: 5 | Status: SHIPPED | OUTPATIENT
Start: 2019-12-20 | End: 2021-09-13 | Stop reason: DRUGHIGH

## 2019-12-24 PROBLEM — N52.9 VASCULOGENIC ERECTILE DYSFUNCTION: Status: ACTIVE | Noted: 2019-12-24

## 2019-12-24 PROBLEM — E55.9 VITAMIN D DEFICIENCY: Status: ACTIVE | Noted: 2019-12-24

## 2019-12-24 NOTE — PROGRESS NOTES
Subjective   Cyrus Landry is a 59 y.o. male. Patient is here today for   Chief Complaint   Patient presents with   • Hyperlipidemia          Vitals:    12/20/19 0756   BP: 112/74   Pulse: 64   Resp: 18   Temp: 98 °F (36.7 °C)   SpO2: 98%     Body mass index is 27.31 kg/m².      Past Medical History:   Diagnosis Date   • Gastric reflux    • Hyperlipidemia    • Inguinal hernia       No Known Allergies   Social History     Socioeconomic History   • Marital status:      Spouse name: Not on file   • Number of children: Not on file   • Years of education: Not on file   • Highest education level: Not on file   Tobacco Use   • Smoking status: Never Smoker   • Smokeless tobacco: Never Used   Substance and Sexual Activity   • Alcohol use: No   • Drug use: No   • Sexual activity: Defer        Current Outpatient Medications:   •  atorvastatin (LIPITOR) 20 MG tablet, TAKE 1 TABLET BY MOUTH ONCE DAILY, Disp: 30 tablet, Rfl: 8  •  Multiple Vitamins-Minerals (MULTIVITAMIN ADULT) tablet, Take 1 tablet by mouth daily., Disp: , Rfl:   •  valACYclovir (VALTREX) 1000 MG tablet, TAKE 1 TABLET BY MOUTH TWICE DAILY, Disp: 12 tablet, Rfl: 2  •  sildenafil (VIAGRA) 25 MG tablet, Take 2 tablets by mouth Daily As Needed for Erectile Dysfunction., Disp: 20 tablet, Rfl: 5  •  vitamin D (ERGOCALCIFEROL) 1.25 MG (74342 UT) capsule capsule, Take 1 capsule by mouth Every 7 (Seven) Days., Disp: 5 capsule, Rfl: 11     Objective     Here today to follow-up on hyperlipidemia.    .He does request a prescription for sildenafil.  He tells me that he has some difficulty with erectile dysfunction.  His desire for sex is not diminished.       Review of Systems   Constitutional: Negative.    HENT: Negative.    Respiratory: Negative.    Cardiovascular: Negative.    Genitourinary:        Erectile dysfunction.   Musculoskeletal: Negative.    Psychiatric/Behavioral: Negative.        Physical Exam   Constitutional: He is oriented to person, place, and time.  He appears well-developed and well-nourished.   HENT:   Head: Normocephalic.   Cardiovascular: Normal rate, regular rhythm and normal heart sounds.   Pulmonary/Chest: Effort normal and breath sounds normal.   Neurological: He is alert and oriented to person, place, and time.   Psychiatric: He has a normal mood and affect. His behavior is normal. Thought content normal.   Nursing note and vitals reviewed.        Problem List Items Addressed This Visit        Cardiovascular and Mediastinum    Hyperlipidemia       Digestive    Vitamin D deficiency       Other    Hyperglycemia    Vasculogenic erectile dysfunction      Other Visit Diagnoses     Need for influenza vaccination    -  Primary    Relevant Orders    Fluarix/Fluzone/Afluria Quad>6 Months (Completed)            PLAN  He has hypercholesterolemia which is well controlled on atorvastatin.  He has high triglycerides, typically a elevated fasting glucose and an elevated hemoglobin A1c.  He is prediabetic.  This of course contributes to his hypertriglyceridemia.  Of asked him to do what he could to remain physically active and I think he does a good job with this.    Would like to see him back in about 6 months.  Lab work prior to that visit should include lipid profile, comprehensive metabolic panel, hemoglobin A1c, urinalysis, vitamin D level.    Vitamin D deficiency.  I written a prescription for vitamin D supplementation.    Erectile dysfunction.  He gave him a prescription for sildenafil.      No follow-ups on file.

## 2020-06-09 DIAGNOSIS — E78.5 HYPERLIPIDEMIA, UNSPECIFIED HYPERLIPIDEMIA TYPE: Primary | ICD-10-CM

## 2020-06-09 DIAGNOSIS — R73.9 HYPERGLYCEMIA: ICD-10-CM

## 2020-06-09 DIAGNOSIS — E55.9 VITAMIN D DEFICIENCY: ICD-10-CM

## 2020-06-16 ENCOUNTER — RESULTS ENCOUNTER (OUTPATIENT)
Dept: FAMILY MEDICINE CLINIC | Facility: CLINIC | Age: 60
End: 2020-06-16

## 2020-06-16 DIAGNOSIS — R73.9 HYPERGLYCEMIA: ICD-10-CM

## 2020-06-16 DIAGNOSIS — E78.5 HYPERLIPIDEMIA, UNSPECIFIED HYPERLIPIDEMIA TYPE: ICD-10-CM

## 2020-06-16 DIAGNOSIS — E55.9 VITAMIN D DEFICIENCY: ICD-10-CM

## 2020-06-16 LAB
25(OH)D3+25(OH)D2 SERPL-MCNC: 24.4 NG/ML (ref 30–100)
ALBUMIN SERPL-MCNC: 4.8 G/DL (ref 3.5–5.2)
ALBUMIN/GLOB SERPL: 1.9 G/DL
ALP SERPL-CCNC: 119 U/L (ref 39–117)
ALT SERPL-CCNC: 26 U/L (ref 1–41)
APPEARANCE UR: CLEAR
AST SERPL-CCNC: 27 U/L (ref 1–40)
BACTERIA #/AREA URNS HPF: NORMAL /HPF
BILIRUB SERPL-MCNC: 0.3 MG/DL (ref 0.2–1.2)
BILIRUB UR QL STRIP: NEGATIVE
BUN SERPL-MCNC: 15 MG/DL (ref 8–23)
BUN/CREAT SERPL: 12.9 (ref 7–25)
CALCIUM SERPL-MCNC: 9.9 MG/DL (ref 8.6–10.5)
CASTS URNS MICRO: NORMAL
CHLORIDE SERPL-SCNC: 103 MMOL/L (ref 98–107)
CHOLEST SERPL-MCNC: 163 MG/DL (ref 0–200)
CO2 SERPL-SCNC: 28.5 MMOL/L (ref 22–29)
COLOR UR: YELLOW
CREAT SERPL-MCNC: 1.16 MG/DL (ref 0.76–1.27)
EPI CELLS #/AREA URNS HPF: NORMAL /HPF
GLOBULIN SER CALC-MCNC: 2.5 GM/DL
GLUCOSE SERPL-MCNC: 95 MG/DL (ref 65–99)
GLUCOSE UR QL: NEGATIVE
HBA1C MFR BLD: 5.7 % (ref 4.8–5.6)
HDLC SERPL-MCNC: 44 MG/DL (ref 40–60)
HGB UR QL STRIP: NEGATIVE
KETONES UR QL STRIP: NEGATIVE
LDLC SERPL CALC-MCNC: 64 MG/DL (ref 0–100)
LDLC/HDLC SERPL: 1.45 {RATIO}
LEUKOCYTE ESTERASE UR QL STRIP: NEGATIVE
NITRITE UR QL STRIP: NEGATIVE
PH UR STRIP: 7.5 [PH] (ref 5–8)
POTASSIUM SERPL-SCNC: 5.1 MMOL/L (ref 3.5–5.2)
PROT SERPL-MCNC: 7.3 G/DL (ref 6–8.5)
PROT UR QL STRIP: NEGATIVE
RBC #/AREA URNS HPF: NORMAL /HPF
SODIUM SERPL-SCNC: 139 MMOL/L (ref 136–145)
SP GR UR: 1.01 (ref 1–1.03)
TRIGL SERPL-MCNC: 276 MG/DL (ref 0–150)
UROBILINOGEN UR STRIP-MCNC: NORMAL MG/DL
VLDLC SERPL CALC-MCNC: 55.2 MG/DL
WBC #/AREA URNS HPF: NORMAL /HPF

## 2020-06-22 ENCOUNTER — OFFICE VISIT (OUTPATIENT)
Dept: FAMILY MEDICINE CLINIC | Facility: CLINIC | Age: 60
End: 2020-06-22

## 2020-06-22 ENCOUNTER — TELEPHONE (OUTPATIENT)
Dept: FAMILY MEDICINE CLINIC | Facility: CLINIC | Age: 60
End: 2020-06-22

## 2020-06-22 VITALS
RESPIRATION RATE: 16 BRPM | TEMPERATURE: 98.9 F | HEIGHT: 70 IN | BODY MASS INDEX: 26.63 KG/M2 | OXYGEN SATURATION: 98 % | DIASTOLIC BLOOD PRESSURE: 74 MMHG | WEIGHT: 186 LBS | SYSTOLIC BLOOD PRESSURE: 118 MMHG | HEART RATE: 72 BPM

## 2020-06-22 DIAGNOSIS — E55.9 VITAMIN D DEFICIENCY: ICD-10-CM

## 2020-06-22 DIAGNOSIS — Z86.19 H/O COLD SORES: ICD-10-CM

## 2020-06-22 DIAGNOSIS — E78.5 HYPERLIPIDEMIA, UNSPECIFIED HYPERLIPIDEMIA TYPE: Primary | ICD-10-CM

## 2020-06-22 DIAGNOSIS — R73.9 HYPERGLYCEMIA: ICD-10-CM

## 2020-06-22 PROCEDURE — 99214 OFFICE O/P EST MOD 30 MIN: CPT | Performed by: NURSE PRACTITIONER

## 2020-06-22 RX ORDER — VALACYCLOVIR HYDROCHLORIDE 1 G/1
1000 TABLET, FILM COATED ORAL 2 TIMES DAILY
Qty: 36 TABLET | Refills: 1 | Status: SHIPPED | OUTPATIENT
Start: 2020-06-22 | End: 2021-03-12

## 2020-06-22 RX ORDER — ATORVASTATIN CALCIUM 40 MG/1
40 TABLET, FILM COATED ORAL DAILY
Qty: 90 TABLET | Refills: 1 | Status: SHIPPED | OUTPATIENT
Start: 2020-06-22 | End: 2021-03-12 | Stop reason: SDUPTHER

## 2020-06-22 NOTE — PROGRESS NOTES
"Tangela Landry is a 59 y.o.. male.     Pt here today for f/u on labs. Pt with hx of HLD, Vitamin D deficiency, and Hyperglycemia. Pt stating he drinks some water and tries to eat healthy. Pt going through a divorce at this time, may be moving back to [place] and having to switch medical insurance soon.       The following portions of the patient's history were reviewed and updated as appropriate: allergies, current medications, past family history, past medical history, past social history, past surgical history and problem list.    Past Medical History:   Diagnosis Date   • Gastric reflux    • Hyperlipidemia    • Inguinal hernia        Past Surgical History:   Procedure Laterality Date   • COLONOSCOPY     • INGUINAL HERNIA REPAIR Bilateral 9/9/2016    Procedure: BILATERAL INGUINAL HERNIA REPAIR LAPAROSCOPIC WITH MESH;  Surgeon: Mustapha Zelaya MD;  Location: [facility] OR [facility];  Service:    • TONSILLECTOMY         Review of Systems   Constitutional: Negative.    Respiratory: Negative.    Cardiovascular: Negative.        No Known Allergies    Objective     Vitals:    06/22/20 1540   BP: 118/74   Pulse: 72   Resp: 16   Temp: 98.9 °F (37.2 °C)   SpO2: 98%   Weight: 84.4 kg (186 lb)   Height: 177.8 cm (70\")     Body mass index is 26.69 kg/m².    Physical Exam   Constitutional: He is oriented to person, place, and time. He appears well-developed.   HENT:   Head: Normocephalic.   Eyes: Pupils are equal, round, and reactive to light.   Neck: Carotid bruit is not present.   Cardiovascular: Normal rate and regular rhythm.   Radial and DP pulses wnl  No ankle edema noted   Pulmonary/Chest: Effort normal and breath sounds normal.   Musculoskeletal: Normal range of motion.   Neurological: He is alert and oriented to person, place, and time.   Vitals reviewed.        Current Outpatient Medications:   •  atorvastatin (Lipitor) 40 MG tablet, Take 1 tablet by mouth Daily., Disp: 90 tablet, Rfl: 1  •  fluticasone " (FLONASE) 50 MCG/ACT nasal spray, 2 sprays into the nostril(s) as directed by provider Daily., Disp: 1 bottle, Rfl: 0  •  guaiFENesin (MUCINEX) 600 MG 12 hr tablet, Take 2 tablets by mouth 2 (Two) Times a Day., Disp: 30 tablet, Rfl: 0  •  Multiple Vitamins-Minerals (MULTIVITAMIN ADULT) tablet, Take 1 tablet by mouth daily., Disp: , Rfl:   •  sildenafil (VIAGRA) 25 MG tablet, Take 2 tablets by mouth Daily As Needed for Erectile Dysfunction., Disp: 20 tablet, Rfl: 5  •  valACYclovir (VALTREX) 1000 MG tablet, Take 1 tablet by mouth 2 (Two) Times a Day. For 7 days as needed, Disp: 36 tablet, Rfl: 1  •  vitamin D (ERGOCALCIFEROL) 1.25 MG (26942 UT) capsule capsule, Take 1 capsule by mouth Every 7 (Seven) Days., Disp: 5 capsule, Rfl: 11    Labs done on 6/16/20:  Total Cholesterol  0 - 200 mg/dL 163    Triglycerides  0 - 150 mg/dL 276High; increased from 172 six months ago; pt admits he is been eating ice cream more recently   HDL Cholesterol  40 - 60 mg/dL 44    VLDL Cholesterol  mg/dL 55.2    LDL Cholesterol   0 - 100 mg/dL 64    LDL/HDL Ratio 1.45      Glucose  65 - 99 mg/dL 95    BUN  8 - 23 mg/dL 15    Creatinine  0.76 - 1.27 mg/dL 1.16    eGFR Non African Am  >60 mL/min/1.73 64    eGFR African Am  >60 mL/min/1.73 78    BUN/Creatinine Ratio  7.0 - 25.0 12.9    Sodium  136 - 145 mmol/L 139    Potassium  3.5 - 5.2 mmol/L 5.1    Chloride  98 - 107 mmol/L 103    Total CO2  22.0 - 29.0 mmol/L 28.5    Calcium  8.6 - 10.5 mg/dL 9.9    Total Protein  6.0 - 8.5 g/dL 7.3    Albumin  3.50 - 5.20 g/dL 4.80    Globulin  gm/dL 2.5    A/G Ratio  g/dL 1.9    Total Bilirubin  0.2 - 1.2 mg/dL 0.3    Alkaline Phosphatase  39 - 117 U/L 119High     AST (SGOT)  1 - 40 U/L 27    ALT (SGPT)  1 - 41 U/L 26      Hemoglobin A1C  4.80 - 5.60 % 5.70High       25 Hydroxy, Vitamin D  30.0 - 100.0 ng/ml 24.4Low; pt admits he is not taking his vitamin d weekly as prescribed.      .005 - 1.030 1.010    pH, UA  5.0 - 8.0 7.5    Color, UA Yellow     Comment: REFERENCE RANGE: Yellow, Straw   Appearance, UA  Clear Clear    Leukocytes, UA  Negative Negative    Protein  Negative Negative    Glucose, UA  Negative Negative    Ketones  Negative Negative    Blood, UA  Negative Negative    Bilirubin, UA  Negative Negative    Urobilinogen, UA Comment      Assessment/Plan   Cyrus was seen today for labs only.    Diagnoses and all orders for this visit:    Hyperlipidemia, unspecified hyperlipidemia type  -     atorvastatin (Lipitor) 40 MG tablet; Take 1 tablet by mouth Daily.    Vitamin D deficiency    Hyperglycemia    H/O cold sores  -     valACYclovir (VALTREX) 1000 MG tablet; Take 1 tablet by mouth 2 (Two) Times a Day. For 7 days as needed        Patient Instructions   HLD: increased pt's lipitor from 20 to 40 mg daily. Pt to eat a heart healthy diet, to decrease sugary/sweets foods/drinks, to decrease white bread, white rice, white potatoes. Pt to follow up in 3 months for fasting lipid panel.    Vitamin D level: Pt informed to take his medication weekly as prescribed.     Hyperglycemia: Discussed with pt what food/drinks to avoid or limit in diet.       Return for follow up in 3 months for fasting lipid panel and appt. afterwards.

## 2020-06-23 NOTE — PATIENT INSTRUCTIONS
HLD: increased pt's lipitor from 20 to 40 mg daily. Pt to eat a heart healthy diet, to decrease sugary/sweets foods/drinks, to decrease white bread, white rice, white potatoes. Pt to follow up in 3 months for fasting lipid panel.    Vitamin D level: Pt informed to take his medication weekly as prescribed.     Hyperglycemia: Discussed with pt what food/drinks to avoid or limit in diet.

## 2020-08-18 DIAGNOSIS — E78.5 HYPERLIPIDEMIA, UNSPECIFIED HYPERLIPIDEMIA TYPE: Primary | ICD-10-CM

## 2020-12-14 ENCOUNTER — TELEMEDICINE (OUTPATIENT)
Dept: FAMILY MEDICINE CLINIC | Facility: CLINIC | Age: 60
End: 2020-12-14

## 2020-12-14 DIAGNOSIS — U07.1 2019 NOVEL CORONAVIRUS DISEASE (COVID-19): Primary | ICD-10-CM

## 2020-12-14 PROCEDURE — 99202 OFFICE O/P NEW SF 15 MIN: CPT | Performed by: NURSE PRACTITIONER

## 2020-12-14 RX ORDER — ALBUTEROL SULFATE 90 UG/1
2 AEROSOL, METERED RESPIRATORY (INHALATION) EVERY 4 HOURS PRN
Qty: 8 G | Refills: 0 | Status: SHIPPED | OUTPATIENT
Start: 2020-12-14 | End: 2021-03-12

## 2020-12-14 RX ORDER — AZITHROMYCIN 250 MG/1
TABLET, FILM COATED ORAL
Qty: 6 TABLET | Refills: 0 | Status: SHIPPED | OUTPATIENT
Start: 2020-12-14 | End: 2021-03-12

## 2020-12-14 RX ORDER — BENZONATATE 100 MG/1
100 CAPSULE ORAL 3 TIMES DAILY PRN
Qty: 21 CAPSULE | Refills: 0 | Status: SHIPPED | OUTPATIENT
Start: 2020-12-14 | End: 2021-03-12

## 2020-12-14 RX ORDER — AMOXICILLIN 875 MG/1
875 TABLET, COATED ORAL 2 TIMES DAILY
COMMUNITY
Start: 2020-12-10 | End: 2021-03-12

## 2020-12-15 NOTE — PROGRESS NOTES
Subjective   Cyrus Landry is a 60 y.o. male presents via telehealth video zoom visit for coronavirus.     History of Present Illness   Coronavirus  New. Tested positive 12/11/20. Symptomatic that day possibly. He is unsure, has been battling allergies for weeks. Overall, feels ok. Reports mild headache, sinus congestion/drainage, nonproductive hacking cough, and mild shortness of breath. Denies fever, body aches or gi s/s, changes in taste/smell. Has been taking mucinex x2 days, which he thinks is thinning drainage and causing cough. No significant pmh that makes him high risk other than age.       The following portions of the patient's history were reviewed and updated as appropriate: allergies, current medications, past family history, past medical history, past social history, past surgical history and problem list.    Review of Systems   Constitutional: Positive for activity change. Negative for chills, fatigue and fever.   HENT: Positive for congestion, postnasal drip, rhinorrhea, sinus pressure and sore throat. Negative for ear pain.    Respiratory: Positive for cough. Negative for chest tightness, shortness of breath and wheezing.    Gastrointestinal: Negative for abdominal pain, diarrhea, nausea and vomiting.   Musculoskeletal: Negative for myalgias.       Objective     No vital signs or bmi obtained for this encounter.      Physical exam-  · General appearance- Alert, appears comfortable. Dressed appropriately. No distress.   · HEENT- external examination of eyes, ears and nose all normal. Head is atraumatic. Hearing normal.   · Neck is symmetric, trachea midline.   · Normal respiratory effort.   · Digits and nails appear healthy. No clubbing, rashes or discolorations.   · Normal range of motion of all extremities.  · Mood appropriate. Communicates easily. Normal judgement and insight. Oriented to time, place and person. Memory appears intact.     Coughs several times during visit but not noticeably short  of breath.        Assessment/Plan   Diagnoses and all orders for this visit:    1. 2019 novel coronavirus disease (COVID-19) (Primary)    Other orders  -     azithromycin (Zithromax Z-Ever) 250 MG tablet; Take 2 tablets by mouth on day 1, then 1 tablet daily on days 2-5  Dispense: 6 tablet; Refill: 0  -     albuterol sulfate  (90 Base) MCG/ACT inhaler; Inhale 2 puffs Every 4 (Four) Hours As Needed for Wheezing.  Dispense: 8 g; Refill: 0  -     benzonatate (Tessalon Perles) 100 MG capsule; Take 1 capsule by mouth 3 (Three) Times a Day As Needed for Cough.  Dispense: 21 capsule; Refill: 0    quarantine according to health dept recommendations  ED if worse  Do not start antibiotic unless dyspnea worse.      Follow up when out of quarantine in office to establish care.    approx 10 min spent on this encounter.  Return in about 1 week (around 12/21/2020), or if symptoms worsen or fail to improve.             Electronically signed by MÓNICA Evans, 12/15/20, 10:47 AM NINA.

## 2021-03-12 ENCOUNTER — OFFICE VISIT (OUTPATIENT)
Dept: FAMILY MEDICINE CLINIC | Facility: CLINIC | Age: 61
End: 2021-03-12

## 2021-03-12 VITALS
HEIGHT: 69 IN | SYSTOLIC BLOOD PRESSURE: 136 MMHG | DIASTOLIC BLOOD PRESSURE: 96 MMHG | RESPIRATION RATE: 16 BRPM | OXYGEN SATURATION: 99 % | HEART RATE: 74 BPM | WEIGHT: 202 LBS | BODY MASS INDEX: 29.92 KG/M2 | TEMPERATURE: 94.2 F

## 2021-03-12 DIAGNOSIS — E78.5 HYPERLIPIDEMIA, UNSPECIFIED HYPERLIPIDEMIA TYPE: ICD-10-CM

## 2021-03-12 DIAGNOSIS — Z12.5 PROSTATE CANCER SCREENING: ICD-10-CM

## 2021-03-12 DIAGNOSIS — Z76.89 ENCOUNTER TO ESTABLISH CARE: ICD-10-CM

## 2021-03-12 DIAGNOSIS — Z00.00 WELL ADULT EXAM: ICD-10-CM

## 2021-03-12 DIAGNOSIS — Z12.11 COLON CANCER SCREENING: Primary | ICD-10-CM

## 2021-03-12 DIAGNOSIS — E66.3 OVERWEIGHT: ICD-10-CM

## 2021-03-12 DIAGNOSIS — E55.9 VITAMIN D DEFICIENCY: ICD-10-CM

## 2021-03-12 PROCEDURE — 99396 PREV VISIT EST AGE 40-64: CPT | Performed by: NURSE PRACTITIONER

## 2021-03-12 RX ORDER — ZOSTER VACCINE RECOMBINANT, ADJUVANTED 50 MCG/0.5
0.5 KIT INTRAMUSCULAR ONCE
Qty: 1 EACH | Refills: 0 | Status: SHIPPED | OUTPATIENT
Start: 2021-03-12 | End: 2021-03-12

## 2021-03-12 RX ORDER — ATORVASTATIN CALCIUM 40 MG/1
40 TABLET, FILM COATED ORAL DAILY
Qty: 90 TABLET | Refills: 1 | Status: SHIPPED | OUTPATIENT
Start: 2021-03-12 | End: 2021-06-08

## 2021-03-12 NOTE — PROGRESS NOTES
"Chief Complaint  Hyperlipidemia (follow up)    Subjective          Cyrus Landry presents to [facility] to establish care and hyperlipidemia.   History of Present Illness  Establish care  Saw pcp regularly (every 6 months)- last visit about 9 months ago.  Had covid in dec 2020, mildly ill    Chronic health problems:  Hyperlipidemia- lipitor   ED- not currently sexually active/taking anything    Surgical history:  Inguinal hernia surgery  Colonoscopy about 12 years ago  Tonsillectomy    Working full time    No children  Active   Diet normal    Never smoking  Rarely/occasional few times a year  No drug use     No current health complaints.    Objective   Vital Signs:   /96 (BP Location: Left arm, Patient Position: Sitting, Cuff Size: Adult)   Pulse 74   Temp 94.2 °F (34.6 °C)   Resp 16   Ht 175.3 cm (69\")   Wt 91.6 kg (202 lb)   SpO2 99%   BMI 29.83 kg/m²     Physical Exam  Vitals and nursing note reviewed.   Constitutional:       General: He is not in acute distress.     Appearance: Normal appearance. He is well-developed and overweight. He is not diaphoretic.   HENT:      Head: Normocephalic and atraumatic.      Right Ear: Hearing, tympanic membrane, ear canal and external ear normal.      Left Ear: Hearing, tympanic membrane, ear canal and external ear normal.      Nose: Nose normal.      Right Sinus: No maxillary sinus tenderness or frontal sinus tenderness.      Left Sinus: No maxillary sinus tenderness or frontal sinus tenderness.      Mouth/Throat:      Pharynx: Uvula midline. No uvula swelling.   Eyes:      General: Lids are normal.      Conjunctiva/sclera: Conjunctivae normal.      Pupils: Pupils are equal, round, and reactive to light.   Neck:      Thyroid: No thyromegaly.      Vascular: Normal carotid pulses. No carotid bruit or JVD.      Trachea: No tracheal deviation.   Cardiovascular:      Rate and Rhythm: Normal rate and regular rhythm.      Heart " sounds: Normal heart sounds.   Pulmonary:      Effort: Pulmonary effort is normal.      Breath sounds: Normal breath sounds.   Abdominal:      General: Bowel sounds are normal. There is no abdominal bruit.      Palpations: Abdomen is soft. There is no mass.      Tenderness: There is no abdominal tenderness.   Musculoskeletal:         General: Normal range of motion.      Cervical back: Full passive range of motion without pain, normal range of motion and neck supple. No muscular tenderness. Normal range of motion.   Lymphadenopathy:      Cervical: No cervical adenopathy.   Skin:     General: Skin is warm and dry.   Neurological:      General: No focal deficit present.      Mental Status: He is alert and oriented to person, place, and time. Mental status is at baseline. He is not disoriented.      GCS: GCS eye subscore is 4. GCS verbal subscore is 5. GCS motor subscore is 6.      Cranial Nerves: No cranial nerve deficit.      Sensory: No sensory deficit.      Coordination: Coordination normal.      Gait: Gait normal.      Deep Tendon Reflexes: Reflexes are normal and symmetric.   Psychiatric:         Behavior: Behavior normal.        Result Review :                 Assessment and Plan    Diagnoses and all orders for this visit:    1. Colon cancer screening (Primary)  -     Cologuard - Stool, Per Rectum; Future    2. Encounter to establish care    3. Hyperlipidemia, unspecified hyperlipidemia type  -     atorvastatin (Lipitor) 40 MG tablet; Take 1 tablet by mouth Daily.  Dispense: 90 tablet; Refill: 1  -     Lipid panel; Future  -     Comprehensive metabolic panel; Future  -     CBC & Differential; Future    4. Overweight    5. Well adult exam    6. Vitamin D deficiency  -     Vitamin D 25 hydroxy; Future    7. Prostate cancer screening  -     PSA SCREENING; Future    Other orders  -     Zoster Vac Recomb Adjuvanted (Shingrix) 50 MCG/0.5ML reconstituted suspension; Inject 0.5 mL into the appropriate muscle as directed  by prescriber 1 (One) Time for 1 dose.  Dispense: 1 each; Refill: 0      Plan:  Fasting labs ordered   shingrix (2/2) ordered   cologuard ordered for colorectal cancer screening  Refill lipitor    Counseling/Anticipatory Guidance: discussed healthy nutrition, physical activity, healthy weight. Discussed injury prevention, avoidance of misuse of tobacco, alcohol and drugs. encouraged healthy sexual behavior and STDs prevention. Recommended routine dental health, mental health visits. Advised of recommended immunizations, screenings via hm.     covid discussed, as well as vaccine. He is going to hold off on this for now and would like to have the [employer] when it is more available in our area.     Patient's Body mass index is 29.83 kg/m². BMI is above normal parameters. Recommendations include: exercise counseling and nutrition counseling     .    I spent 30 minutes caring for Cyrus on this date of service. This time includes time spent by me in the following activities:preparing for the visit, obtaining and/or reviewing a separately obtained history, performing a medically appropriate examination and/or evaluation , counseling and educating the patient/family/caregiver, ordering medications, tests, or procedures and documenting information in the medical record  Follow Up   Return in about 6 months (around 9/12/2021) for Recheck.  Patient was given instructions and counseling regarding his condition or for health maintenance advice. Please see specific information pulled into the AVS if appropriate.

## 2021-04-19 ENCOUNTER — TELEPHONE (OUTPATIENT)
Dept: FAMILY MEDICINE CLINIC | Facility: CLINIC | Age: 61
End: 2021-04-19

## 2021-04-19 NOTE — TELEPHONE ENCOUNTER
Called pt to remind him of his overdue fasting labs and to confirm that he received his Cologuard kit in the mail.

## 2021-06-02 ENCOUNTER — TELEPHONE (OUTPATIENT)
Dept: FAMILY MEDICINE CLINIC | Facility: CLINIC | Age: 61
End: 2021-06-02

## 2021-06-02 NOTE — TELEPHONE ENCOUNTER
Called Pt's insurance to inquire if test will be paid for under Pt's plan, Ins states that this is a non-covered test under the Pt's current plan.

## 2021-06-02 NOTE — TELEPHONE ENCOUNTER
Informed Pt that ins will not cover. He is not interested in completing this if it is not going to be paid for by his ins. Pt states he has another insurance coverage and he thinks it might cover it. We do not have this other insurance information on file, Pt states he is going to contact them and inquire if test is covered under that plan.

## 2021-06-02 NOTE — TELEPHONE ENCOUNTER
Pt inquiring about price of Cologuard order. States he is agreeable to completing this if his insurance will cover it but he wants to know if it is going to be paid for first.

## 2021-06-08 DIAGNOSIS — E78.5 HYPERLIPIDEMIA, UNSPECIFIED HYPERLIPIDEMIA TYPE: ICD-10-CM

## 2021-06-08 RX ORDER — ATORVASTATIN CALCIUM 40 MG/1
TABLET, FILM COATED ORAL
Qty: 90 TABLET | Refills: 1 | Status: SHIPPED | OUTPATIENT
Start: 2021-06-08 | End: 2022-04-08

## 2021-06-22 ENCOUNTER — TELEPHONE (OUTPATIENT)
Dept: FAMILY MEDICINE CLINIC | Facility: CLINIC | Age: 61
End: 2021-06-22

## 2021-06-22 NOTE — TELEPHONE ENCOUNTER
Called to find out if patient called his other insurance company to see if Cologuard would be covered under it. No answer. LVM to return call.

## 2021-09-13 ENCOUNTER — OFFICE VISIT (OUTPATIENT)
Dept: FAMILY MEDICINE CLINIC | Facility: CLINIC | Age: 61
End: 2021-09-13

## 2021-09-13 VITALS
BODY MASS INDEX: 30.9 KG/M2 | HEIGHT: 69 IN | RESPIRATION RATE: 20 BRPM | HEART RATE: 73 BPM | DIASTOLIC BLOOD PRESSURE: 75 MMHG | SYSTOLIC BLOOD PRESSURE: 145 MMHG | WEIGHT: 208.6 LBS | OXYGEN SATURATION: 96 % | TEMPERATURE: 97.5 F

## 2021-09-13 DIAGNOSIS — N52.9 VASCULOGENIC ERECTILE DYSFUNCTION, UNSPECIFIED VASCULOGENIC ERECTILE DYSFUNCTION TYPE: ICD-10-CM

## 2021-09-13 DIAGNOSIS — E78.2 MIXED HYPERLIPIDEMIA: Primary | ICD-10-CM

## 2021-09-13 DIAGNOSIS — Z12.11 COLON CANCER SCREENING: ICD-10-CM

## 2021-09-13 PROCEDURE — 99214 OFFICE O/P EST MOD 30 MIN: CPT | Performed by: NURSE PRACTITIONER

## 2021-09-13 RX ORDER — SILDENAFIL CITRATE 20 MG/1
20 TABLET ORAL DAILY PRN
Qty: 5 TABLET | Refills: 2 | Status: SHIPPED | OUTPATIENT
Start: 2021-09-13 | End: 2022-10-21 | Stop reason: SDUPTHER

## 2021-09-13 NOTE — PROGRESS NOTES
"Chief Complaint  Hyperlipidemia (6 month follow up )    Subjective          Cyrus Landry presents to [facility] for follow up on chronic issues.  History of Present Illness  Reports he has been doing well  No hospitalizations or major changes in his health  Did not have labs completed in march   Insurance not covering cologuard- needs referral to gi for colonoscopy    Hyperlipidemia  Chronic. Stable with lipitor. Due for fasting labs. No new issues.     ED  Chronic. Stable no new issues. Needs refill of medication.        Objective   Vital Signs:   /75 (BP Location: Right arm, Patient Position: Sitting, Cuff Size: Adult)   Pulse 73   Temp 97.5 °F (36.4 °C) (Temporal)   Resp 20   Ht 175.3 cm (69\") Comment: TX  Wt 94.6 kg (208 lb 9.6 oz)   SpO2 96%   BMI 30.80 kg/m²     Physical Exam  Vitals and nursing note reviewed.   Constitutional:       General: He is not in acute distress.     Appearance: He is well-developed.   HENT:      Right Ear: Tympanic membrane and ear canal normal.      Left Ear: Tympanic membrane and ear canal normal.      Nose: Nose normal.      Right Sinus: No maxillary sinus tenderness or frontal sinus tenderness.      Left Sinus: No maxillary sinus tenderness or frontal sinus tenderness.      Mouth/Throat:      Mouth: Mucous membranes are moist.      Pharynx: Oropharynx is clear. Uvula midline. No uvula swelling.   Eyes:      Conjunctiva/sclera: Conjunctivae normal.   Neck:      Thyroid: No thyromegaly.      Trachea: No tracheal deviation.   Cardiovascular:      Rate and Rhythm: Normal rate and regular rhythm.      Heart sounds: Normal heart sounds.   Pulmonary:      Effort: Pulmonary effort is normal.      Breath sounds: Normal breath sounds.   Abdominal:      General: Bowel sounds are normal.      Palpations: Abdomen is soft. There is no mass.      Tenderness: There is no abdominal tenderness.   Musculoskeletal:      Cervical back: Neck supple. "   Lymphadenopathy:      Cervical: No cervical adenopathy.   Skin:     General: Skin is warm and dry.   Neurological:      Mental Status: He is alert.   Psychiatric:         Mood and Affect: Mood normal.         Behavior: Behavior normal.        Result Review :                 Assessment and Plan    Diagnoses and all orders for this visit:    1. Mixed hyperlipidemia (Primary)  -     Cancel: Lipid panel  -     Cancel: Comprehensive metabolic panel    2. Vasculogenic erectile dysfunction, unspecified vasculogenic erectile dysfunction type    3. Colon cancer screening  -     Ambulatory Referral to Gastroenterology    Other orders  -     sildenafil (REVATIO) 20 MG tablet; Take 1 tablet by mouth Daily As Needed (ED).  Dispense: 5 tablet; Refill: 2      Plan:  Fasting labs today   Refill and continue current treatment  Referral to gi for colon cancer screening      Follow Up   Return in about 6 months (around 3/13/2022) for Recheck.  Patient was given instructions and counseling regarding his condition or for health maintenance advice. Please see specific information pulled into the AVS if appropriate.

## 2021-09-17 RX ORDER — FENOFIBRATE 48 MG/1
48 TABLET, COATED ORAL DAILY
Qty: 30 TABLET | Refills: 2 | Status: SHIPPED | OUTPATIENT
Start: 2021-09-17 | End: 2021-12-09

## 2021-09-17 RX ORDER — ERGOCALCIFEROL 1.25 MG/1
50000 CAPSULE ORAL WEEKLY
Qty: 12 CAPSULE | Refills: 0 | Status: SHIPPED | OUTPATIENT
Start: 2021-09-17 | End: 2022-04-08

## 2021-10-28 ENCOUNTER — TELEPHONE (OUTPATIENT)
Dept: FAMILY MEDICINE CLINIC | Facility: CLINIC | Age: 61
End: 2021-10-28

## 2021-10-28 NOTE — TELEPHONE ENCOUNTER
Caller: Cyrus Landry    Relationship: Self    Best call back number: [phone]    What is the best time to reach you:   ANYTIME    Who are you requesting to speak with (clinical staff, provider,  specific staff member):   NURSE    Do you know the name of the person who called:   MOUNIKA    What was the call regarding:   PATIENT RETURNING A CALL REGARDING REFERRAL    Do you require a callback:   YES

## 2021-11-11 ENCOUNTER — TELEPHONE (OUTPATIENT)
Dept: FAMILY MEDICINE CLINIC | Facility: CLINIC | Age: 61
End: 2021-11-11

## 2021-12-09 RX ORDER — FENOFIBRATE 48 MG/1
TABLET, COATED ORAL
Qty: 30 TABLET | Refills: 0 | Status: SHIPPED | OUTPATIENT
Start: 2021-12-09 | End: 2022-02-09

## 2021-12-09 NOTE — TELEPHONE ENCOUNTER
Rx Refill Note  Requested Prescriptions     Pending Prescriptions Disp Refills   • fenofibrate (TRICOR) 48 MG tablet [Pharmacy Med Name: FENOFIBRATE 48 MG TAB 48 Tablet] 30 tablet 2     Sig: TAKE ONE TABLET BY MOUTH DAILY      Last office visit with prescribing clinician: 9/13/2021      Next office visit with prescribing clinician: 3/15/2022   CPE done 03/2021    Please add Last Relevant Lab Date if appropriate: 09/14/2021    Is Refill Pharmacy correct?: yes    Tri Taylor MA  12/09/21, 15:06 CST

## 2022-02-08 NOTE — TELEPHONE ENCOUNTER
Rx Refill Note  Requested Prescriptions     Pending Prescriptions Disp Refills   • fenofibrate (TRICOR) 48 MG tablet [Pharmacy Med Name: FENOFIBRATE 48 MG TAB 48 Tablet] 30 tablet 0     Sig: TAKE ONE TABLET BY MOUTH DAILY      Last office visit with prescribing clinician: 9/13/2021      Next office visit with prescribing clinician: 3/15/2022            Sera De Souza MA  02/08/22, 16:20 CST

## 2022-02-09 RX ORDER — FENOFIBRATE 48 MG/1
TABLET, COATED ORAL
Qty: 30 TABLET | Refills: 0 | Status: SHIPPED | OUTPATIENT
Start: 2022-02-09 | End: 2022-03-09

## 2022-03-09 RX ORDER — FENOFIBRATE 48 MG/1
TABLET, COATED ORAL
Qty: 30 TABLET | Refills: 0 | Status: SHIPPED | OUTPATIENT
Start: 2022-03-09 | End: 2022-04-08 | Stop reason: SDUPTHER

## 2022-03-09 NOTE — TELEPHONE ENCOUNTER
Rx Refill Note  Requested Prescriptions     Pending Prescriptions Disp Refills   • fenofibrate (TRICOR) 48 MG tablet [Pharmacy Med Name: FENOFIBRATE 48 MG TAB 48 Tablet] 30 tablet 0     Sig: TAKE ONE TABLET BY MOUTH DAILY      Last office visit with prescribing clinician: 9/13/2021      Next office visit with prescribing clinician: Visit date not found            Sera De Souza MA  03/09/22, 16:57 CST

## 2022-04-06 RX ORDER — FENOFIBRATE 48 MG/1
TABLET, COATED ORAL
Qty: 30 TABLET | Refills: 0 | OUTPATIENT
Start: 2022-04-06

## 2022-04-06 NOTE — TELEPHONE ENCOUNTER
Rx Refill Note  Requested Prescriptions     Pending Prescriptions Disp Refills   • fenofibrate (TRICOR) 48 MG tablet [Pharmacy Med Name: FENOFIBRATE 48 MG TAB 48 Tablet] 30 tablet 0     Sig: TAKE ONE TABLET BY MOUTH DAILY      Last office visit with prescribing clinician: 9/13/2021      Next office visit with prescribing clinician: None  Last Filled  03/09/22 #30 no rf           Pamella Troncoso MA  04/06/22, 08:12 CDT

## 2022-04-08 ENCOUNTER — OFFICE VISIT (OUTPATIENT)
Dept: FAMILY MEDICINE CLINIC | Facility: CLINIC | Age: 62
End: 2022-04-08

## 2022-04-08 VITALS
TEMPERATURE: 98 F | HEIGHT: 69 IN | WEIGHT: 208 LBS | BODY MASS INDEX: 30.81 KG/M2 | HEART RATE: 58 BPM | RESPIRATION RATE: 18 BRPM | OXYGEN SATURATION: 99 % | DIASTOLIC BLOOD PRESSURE: 92 MMHG | SYSTOLIC BLOOD PRESSURE: 134 MMHG

## 2022-04-08 DIAGNOSIS — N52.9 VASCULOGENIC ERECTILE DYSFUNCTION, UNSPECIFIED VASCULOGENIC ERECTILE DYSFUNCTION TYPE: ICD-10-CM

## 2022-04-08 DIAGNOSIS — J01.90 ACUTE NON-RECURRENT SINUSITIS, UNSPECIFIED LOCATION: ICD-10-CM

## 2022-04-08 DIAGNOSIS — E78.1 HIGH TRIGLYCERIDES: Primary | ICD-10-CM

## 2022-04-08 DIAGNOSIS — E55.9 VITAMIN D DEFICIENCY: ICD-10-CM

## 2022-04-08 PROCEDURE — 99214 OFFICE O/P EST MOD 30 MIN: CPT | Performed by: NURSE PRACTITIONER

## 2022-04-08 RX ORDER — FENOFIBRATE 48 MG/1
48 TABLET, COATED ORAL DAILY
Qty: 30 TABLET | Refills: 5 | Status: SHIPPED | OUTPATIENT
Start: 2022-04-08 | End: 2022-08-23 | Stop reason: SDUPTHER

## 2022-04-08 RX ORDER — CHOLECALCIFEROL (VITAMIN D3) 25 MCG
1000 CAPSULE ORAL DAILY
Refills: 0
Start: 2022-04-08

## 2022-04-08 RX ORDER — CEFDINIR 300 MG/1
300 CAPSULE ORAL 2 TIMES DAILY
Qty: 20 CAPSULE | Refills: 0 | Status: SHIPPED | OUTPATIENT
Start: 2022-04-08 | End: 2022-10-04

## 2022-04-08 NOTE — PROGRESS NOTES
"Chief Complaint  Hyperlipidemia (Follow up)    Subjective          Cyrus Landry presents to [facility]  History of Present Illness  Here for follow up on chronic issues:  Elevated triglycerides  Chronic. Currently on tricor. Needs rechecked.     ED stable, no new issues. Has not used medication recently.     Blood pressure is elevated. Reports that it typically runs normal except when he is at the doctors office. He denies any symptoms of this.     Reports recent sinus congestion, taste of infection in drainage with purulent drainage. He typically does sinus rinse and neti pot.       Objective   Vital Signs:   /92 (BP Location: Left arm, Patient Position: Sitting, Cuff Size: Large Adult)   Pulse 58   Temp 98 °F (36.7 °C) (Infrared)   Resp 18   Ht 175.3 cm (69\") Comment: per patient  Wt 94.3 kg (208 lb)   SpO2 99%   BMI 30.72 kg/m²     BMI is above normal parameters. Recommendations: exercise counseling/recommendations and nutrition counseling/recommendations       Physical Exam  Vitals and nursing note reviewed.   Constitutional:       Appearance: He is well-developed.   HENT:      Head: Normocephalic and atraumatic.   Eyes:      Conjunctiva/sclera: Conjunctivae normal.   Cardiovascular:      Rate and Rhythm: Normal rate and regular rhythm.      Pulses: Normal pulses.      Heart sounds: Normal heart sounds.   Pulmonary:      Effort: Pulmonary effort is normal.      Breath sounds: Normal breath sounds.   Abdominal:      General: Abdomen is flat.      Palpations: Abdomen is soft.   Musculoskeletal:      Cervical back: Normal range of motion.   Skin:     General: Skin is warm and dry.   Neurological:      General: No focal deficit present.      Mental Status: He is alert and oriented to person, place, and time.   Psychiatric:         Mood and Affect: Mood normal.         Behavior: Behavior normal.         Thought Content: Thought content normal.         Judgment: Judgment " normal.        Result Review :                 Assessment and Plan    Diagnoses and all orders for this visit:    1. Mixed hyperlipidemia (Primary)    2. Vasculogenic erectile dysfunction, unspecified vasculogenic erectile dysfunction type    3. Vitamin D deficiency    4. High triglycerides  -     Comprehensive metabolic panel    Other orders  -     fenofibrate (TRICOR) 48 MG tablet; Take 1 tablet by mouth Daily.  Dispense: 30 tablet; Refill: 5  -     Cholecalciferol (Vitamin D-3) 25 MCG (1000 UT) capsule; Take 1,000 Units by mouth Daily.; Refill: 0      Plan:  Going to get vit d otc  Continue tricor for hypertriglyceridemia   cmp today     May try sinus rinse first- if no improvement recommend starting cefdinir        Follow Up   Return in about 6 months (around 10/8/2022) for Recheck.  Patient was given instructions and counseling regarding his condition or for health maintenance advice. Please see specific information pulled into the AVS if appropriate.

## 2022-04-09 LAB
ALBUMIN SERPL-MCNC: 4.8 G/DL (ref 3.5–5.2)
ALBUMIN/GLOB SERPL: 1.7 G/DL
ALP SERPL-CCNC: 115 U/L (ref 39–117)
ALT SERPL-CCNC: 35 U/L (ref 1–41)
AST SERPL-CCNC: 24 U/L (ref 1–40)
BILIRUB SERPL-MCNC: 0.3 MG/DL (ref 0–1.2)
BUN SERPL-MCNC: 14 MG/DL (ref 8–23)
BUN/CREAT SERPL: 12.4 (ref 7–25)
CALCIUM SERPL-MCNC: 10.3 MG/DL (ref 8.6–10.5)
CHLORIDE SERPL-SCNC: 101 MMOL/L (ref 98–107)
CO2 SERPL-SCNC: 27.4 MMOL/L (ref 22–29)
CREAT SERPL-MCNC: 1.13 MG/DL (ref 0.76–1.27)
EGFRCR SERPLBLD CKD-EPI 2021: 73 ML/MIN/1.73
GLOBULIN SER CALC-MCNC: 2.8 GM/DL
GLUCOSE SERPL-MCNC: 100 MG/DL (ref 65–99)
POTASSIUM SERPL-SCNC: 4.7 MMOL/L (ref 3.5–5.2)
PROT SERPL-MCNC: 7.6 G/DL (ref 6–8.5)
SODIUM SERPL-SCNC: 138 MMOL/L (ref 136–145)

## 2022-04-15 ENCOUNTER — TELEPHONE (OUTPATIENT)
Dept: FAMILY MEDICINE CLINIC | Facility: CLINIC | Age: 62
End: 2022-04-15

## 2022-04-15 NOTE — TELEPHONE ENCOUNTER
Called pt back to inquire- pt unsure why antibiotic was sent to pharmacy. Advised pt per ov on 4/8/2022 pt was instructed to try sinus rinses first and if no improvement could try antibiotic. Pt advised that scripts were sent to [facility] instead of [place] [facility]. Offered to pt to transfer rxs to [place] pharmacy. Pt agreeable, doesn't need the VitD transferred takes OTC. Pt has already picked up a 30 day supply of fenofibrate, would like refills transferred and filled in a 90 supply.     Called [place] pharmacy to request transfer- spoke with Jing, will get transferred, will fill fenofibrate at 90 if insurance allows.

## 2022-07-07 ENCOUNTER — TELEPHONE (OUTPATIENT)
Dept: FAMILY MEDICINE CLINIC | Facility: CLINIC | Age: 62
End: 2022-07-07

## 2022-07-07 NOTE — TELEPHONE ENCOUNTER
Left patient voicemail letting them know they are due for annual physical asking to call back to schedule jayne.

## 2022-08-19 ENCOUNTER — PATIENT MESSAGE (OUTPATIENT)
Dept: FAMILY MEDICINE CLINIC | Facility: CLINIC | Age: 62
End: 2022-08-19

## 2022-08-23 RX ORDER — FENOFIBRATE 48 MG/1
48 TABLET, COATED ORAL DAILY
Qty: 30 TABLET | Refills: 5 | Status: SHIPPED | OUTPATIENT
Start: 2022-08-23 | End: 2022-10-21 | Stop reason: SDUPTHER

## 2022-08-23 NOTE — TELEPHONE ENCOUNTER
Rx Refill Note  Requested Prescriptions     Pending Prescriptions Disp Refills   • fenofibrate (TRICOR) 48 MG tablet 30 tablet 5     Sig: Take 1 tablet by mouth Daily.      Last office visit with prescribing clinician: 04/08/2022      Next office visit with prescribing clinician: 10/07/2022            Sera De Souza MA  08/23/22, 09:38 CDT

## 2022-08-23 NOTE — TELEPHONE ENCOUNTER
From: MICHELLE ANDERSON  To: Cyrus Landry  Sent: 8/19/2022 10:32 AM CDT  Subject: Annual Physical    It looks like you are due for your annual yearly physical. This appointment is where Magno will go over preventative type things such as a colorectal cancer screening. If you would like I can schedule this with your next follow up appointment on 10/07/2022. If you would like to come in to do this before then we can do that as well. Please let me know what works best for you.     MICHELLE Zamora

## 2022-10-03 ENCOUNTER — TELEPHONE (OUTPATIENT)
Dept: FAMILY MEDICINE CLINIC | Facility: CLINIC | Age: 62
End: 2022-10-03

## 2022-10-03 NOTE — TELEPHONE ENCOUNTER
Pt called back on after hours nurse line to check on status of med- advised pt that he would need to complete an apt with in office or via mychart- pt scheduled for 10/4/2022.

## 2022-10-03 NOTE — TELEPHONE ENCOUNTER
Caller: Cyrus Landry    Relationship: Self    Best call back number: [phone]    What medication are you requesting: Z PACK     What are your current symptoms: CONGESTION, COUGH, SORE THROAT     How long have you been experiencing symptoms: 2 WEEKS     Have you had these symptoms before:    [x] Yes  [] No    Have you been treated for these symptoms before:   [x] Yes  [] No    If a prescription is needed, what is your preferred pharmacy and phone number:     [facility] - [place] - [address] - [phone] [facility] [phone]   [phone]    Additional notes: PATIENT STATES THAT HE HAD THESE SYMPTOMS BEFORE AND THEN THEY CAME BACK. PATIENT ALSO IS SUPPOSE TO BE SEEN ON Friday. HOPING TO GET IT TODAY IF POSSIBLE.

## 2022-10-04 ENCOUNTER — OFFICE VISIT (OUTPATIENT)
Dept: FAMILY MEDICINE CLINIC | Facility: CLINIC | Age: 62
End: 2022-10-04

## 2022-10-04 VITALS
WEIGHT: 200 LBS | TEMPERATURE: 98.4 F | HEIGHT: 69 IN | OXYGEN SATURATION: 99 % | HEART RATE: 94 BPM | DIASTOLIC BLOOD PRESSURE: 86 MMHG | BODY MASS INDEX: 29.62 KG/M2 | SYSTOLIC BLOOD PRESSURE: 147 MMHG | RESPIRATION RATE: 18 BRPM

## 2022-10-04 DIAGNOSIS — J01.40 ACUTE NON-RECURRENT PANSINUSITIS: Primary | ICD-10-CM

## 2022-10-04 PROCEDURE — 99214 OFFICE O/P EST MOD 30 MIN: CPT | Performed by: NURSE PRACTITIONER

## 2022-10-04 RX ORDER — METHYLPREDNISOLONE 4 MG/1
TABLET ORAL
Qty: 21 TABLET | Refills: 0 | Status: SHIPPED | OUTPATIENT
Start: 2022-10-04

## 2022-10-04 RX ORDER — AMOXICILLIN AND CLAVULANATE POTASSIUM 875; 125 MG/1; MG/1
1 TABLET, FILM COATED ORAL 2 TIMES DAILY
Qty: 14 TABLET | Refills: 0 | Status: SHIPPED | OUTPATIENT
Start: 2022-10-04

## 2022-10-04 NOTE — PROGRESS NOTES
"Chief Complaint  Sinus Problem (Sinus pressure/pain, sore throat, cough x 1 week)    Subjective        Cyrus Landry presents to [facility]  History of Present Illness  Here for acute visit.   Reports nasal congestion, pnd, sinus pressure and pain, scratchy irritated throat, mild cough for about 1 week. Reports symptoms started about 2 weeks ago and seemed to improve but then has returned. He has been using otc nyquil to try to help without improvement.   He denies any recent sick contacts.   Reports the weather change/dry and danielle environment is likely not helping.   He denies any fevers, shortness of breath, ear pain.       Objective   Vital Signs:  /86 (BP Location: Right arm, Patient Position: Sitting, Cuff Size: Adult)   Pulse 94   Temp 98.4 °F (36.9 °C) (Infrared)   Resp 18   Ht 175.3 cm (69\") Comment: per patient  Wt 90.7 kg (200 lb)   SpO2 99%   BMI 29.53 kg/m²   Estimated body mass index is 29.53 kg/m² as calculated from the following:    Height as of this encounter: 175.3 cm (69\").    Weight as of this encounter: 90.7 kg (200 lb).    \plain      Physical Exam  Vitals and nursing note reviewed.   Constitutional:       General: He is not in acute distress.     Appearance: He is well-developed.   HENT:      Head: Normocephalic and atraumatic.      Right Ear: Tympanic membrane and ear canal normal.      Left Ear: Tympanic membrane and ear canal normal.      Nose: Mucosal edema, congestion and rhinorrhea present.      Right Sinus: No maxillary sinus tenderness or frontal sinus tenderness.      Left Sinus: Maxillary sinus tenderness present. No frontal sinus tenderness.      Mouth/Throat:      Mouth: Mucous membranes are moist.      Pharynx: Uvula midline. Oropharyngeal exudate (thick purulent pnd) present. No uvula swelling.   Eyes:      Conjunctiva/sclera: Conjunctivae normal.   Neck:      Thyroid: No thyromegaly.      Trachea: No tracheal deviation. "   Cardiovascular:      Rate and Rhythm: Normal rate and regular rhythm.      Heart sounds: Normal heart sounds.   Pulmonary:      Effort: Pulmonary effort is normal.      Breath sounds: Normal breath sounds.   Musculoskeletal:      Cervical back: Neck supple.   Lymphadenopathy:      Cervical: No cervical adenopathy.   Skin:     General: Skin is warm and dry.   Neurological:      Mental Status: He is alert.   Psychiatric:         Behavior: Behavior normal.        Result Review :                Assessment and Plan   Diagnoses and all orders for this visit:    1. Acute non-recurrent pansinusitis (Primary)    Other orders  -     amoxicillin-clavulanate (Augmentin) 875-125 MG per tablet; Take 1 tablet by mouth 2 (Two) Times a Day.  Dispense: 14 tablet; Refill: 0  -     methylPREDNISolone (MEDROL) 4 MG dose pack; Take as directed on package instructions.  Dispense: 21 tablet; Refill: 0      Continue supportive treatment  May use humidifier in room            Follow Up   Return in about 1 week (around 10/11/2022), or if symptoms worsen or fail to improve.  Patient was given instructions and counseling regarding his condition or for health maintenance advice. Please see specific information pulled into the AVS if appropriate.

## 2022-10-05 ENCOUNTER — PATIENT MESSAGE (OUTPATIENT)
Dept: FAMILY MEDICINE CLINIC | Facility: CLINIC | Age: 62
End: 2022-10-05

## 2022-10-05 RX ORDER — VALACYCLOVIR HYDROCHLORIDE 1 G/1
1000 TABLET, FILM COATED ORAL 2 TIMES DAILY
Qty: 14 TABLET | Refills: 2 | Status: SHIPPED | OUTPATIENT
Start: 2022-10-05 | End: 2022-10-21 | Stop reason: SDUPTHER

## 2022-10-05 NOTE — TELEPHONE ENCOUNTER
From: Cyrus Landry  To: MÓNICA Evans  Sent: 10/5/2022 8:25 AM CDT  Subject: ValACYclovir    Magno,   I used to have a generic prescription for Valtrax.   Can you get a refill for me? [facility].  I would like to have at least on hand. Right now I have a core sore outbreak due to medicines and dry weather.

## 2022-10-21 ENCOUNTER — OFFICE VISIT (OUTPATIENT)
Dept: FAMILY MEDICINE CLINIC | Facility: CLINIC | Age: 62
End: 2022-10-21

## 2022-10-21 VITALS
SYSTOLIC BLOOD PRESSURE: 158 MMHG | OXYGEN SATURATION: 99 % | SYSTOLIC BLOOD PRESSURE: 158 MMHG | RESPIRATION RATE: 20 BRPM | HEIGHT: 69 IN | TEMPERATURE: 98.4 F | DIASTOLIC BLOOD PRESSURE: 92 MMHG | TEMPERATURE: 98.4 F | WEIGHT: 197.8 LBS | HEART RATE: 56 BPM | WEIGHT: 197.8 LBS | HEART RATE: 56 BPM | BODY MASS INDEX: 29.3 KG/M2 | OXYGEN SATURATION: 99 % | HEIGHT: 69 IN | RESPIRATION RATE: 20 BRPM | DIASTOLIC BLOOD PRESSURE: 92 MMHG | BODY MASS INDEX: 29.3 KG/M2

## 2022-10-21 DIAGNOSIS — E66.3 OVERWEIGHT: ICD-10-CM

## 2022-10-21 DIAGNOSIS — Z13.220 SCREENING FOR CHOLESTEROL LEVEL: ICD-10-CM

## 2022-10-21 DIAGNOSIS — E78.2 MIXED HYPERLIPIDEMIA: ICD-10-CM

## 2022-10-21 DIAGNOSIS — E55.9 VITAMIN D DEFICIENCY: Primary | ICD-10-CM

## 2022-10-21 DIAGNOSIS — Z53.20 COLON CANCER SCREENING DECLINED: ICD-10-CM

## 2022-10-21 DIAGNOSIS — Z00.00 ANNUAL PHYSICAL EXAM: Primary | ICD-10-CM

## 2022-10-21 DIAGNOSIS — Z12.5 PROSTATE CANCER SCREENING: ICD-10-CM

## 2022-10-21 DIAGNOSIS — E55.9 VITAMIN D DEFICIENCY: ICD-10-CM

## 2022-10-21 DIAGNOSIS — Z12.5 SCREENING PSA (PROSTATE SPECIFIC ANTIGEN): ICD-10-CM

## 2022-10-21 PROCEDURE — 99396 PREV VISIT EST AGE 40-64: CPT | Performed by: NURSE PRACTITIONER

## 2022-10-21 PROCEDURE — 99214 OFFICE O/P EST MOD 30 MIN: CPT | Performed by: NURSE PRACTITIONER

## 2022-10-21 RX ORDER — SILDENAFIL CITRATE 20 MG/1
20 TABLET ORAL DAILY PRN
Qty: 5 TABLET | Refills: 2 | Status: SHIPPED | OUTPATIENT
Start: 2022-10-21

## 2022-10-21 RX ORDER — FENOFIBRATE 48 MG/1
TABLET, COATED ORAL
Qty: 30 TABLET | Refills: 5 | OUTPATIENT
Start: 2022-10-21

## 2022-10-21 RX ORDER — VALACYCLOVIR HYDROCHLORIDE 1 G/1
1000 TABLET, FILM COATED ORAL 2 TIMES DAILY
Qty: 14 TABLET | Refills: 2 | Status: SHIPPED | OUTPATIENT
Start: 2022-10-21

## 2022-10-21 RX ORDER — FENOFIBRATE 48 MG/1
48 TABLET, COATED ORAL DAILY
Qty: 90 TABLET | Refills: 2 | Status: SHIPPED | OUTPATIENT
Start: 2022-10-21

## 2022-10-21 NOTE — TELEPHONE ENCOUNTER
Duplicate request.       Rx Refill Note  Requested Prescriptions     Pending Prescriptions Disp Refills   • fenofibrate (TRICOR) 48 MG tablet [Pharmacy Med Name: FENOFIBRATE 48 MG TAB 48 Tablet] 30 tablet 5     Sig: TAKE ONE TABLET BY MOUTH DAILY      Last office visit with prescribing clinician: 10/21/2022      Next office visit with prescribing clinician: 4/21/2023            Kayley Garcia LPN  10/21/22, 17:14 CDT

## 2022-10-22 LAB
25(OH)D3+25(OH)D2 SERPL-MCNC: 44.4 NG/ML (ref 30–100)
ALBUMIN SERPL-MCNC: 4.8 G/DL (ref 3.5–5.2)
ALBUMIN/GLOB SERPL: 1.9 G/DL
ALP SERPL-CCNC: 126 U/L (ref 39–117)
ALT SERPL-CCNC: 45 U/L (ref 1–41)
AST SERPL-CCNC: 33 U/L (ref 1–40)
BASOPHILS # BLD AUTO: 0.11 10*3/MM3 (ref 0–0.2)
BASOPHILS NFR BLD AUTO: 1.2 % (ref 0–1.5)
BILIRUB SERPL-MCNC: 0.4 MG/DL (ref 0–1.2)
BUN SERPL-MCNC: 13 MG/DL (ref 8–23)
BUN/CREAT SERPL: 12.9 (ref 7–25)
CALCIUM SERPL-MCNC: 10.1 MG/DL (ref 8.6–10.5)
CHLORIDE SERPL-SCNC: 102 MMOL/L (ref 98–107)
CHOLEST SERPL-MCNC: 270 MG/DL (ref 0–200)
CO2 SERPL-SCNC: 25.2 MMOL/L (ref 22–29)
CREAT SERPL-MCNC: 1.01 MG/DL (ref 0.76–1.27)
EGFRCR SERPLBLD CKD-EPI 2021: 83 ML/MIN/1.73
EOSINOPHIL # BLD AUTO: 0.24 10*3/MM3 (ref 0–0.4)
EOSINOPHIL NFR BLD AUTO: 2.7 % (ref 0.3–6.2)
ERYTHROCYTE [DISTWIDTH] IN BLOOD BY AUTOMATED COUNT: 13.4 % (ref 12.3–15.4)
GLOBULIN SER CALC-MCNC: 2.5 GM/DL
GLUCOSE SERPL-MCNC: 96 MG/DL (ref 65–99)
HCT VFR BLD AUTO: 44.9 % (ref 37.5–51)
HDLC SERPL-MCNC: 39 MG/DL (ref 40–60)
HGB BLD-MCNC: 15.1 G/DL (ref 13–17.7)
IMM GRANULOCYTES # BLD AUTO: 0.04 10*3/MM3 (ref 0–0.05)
IMM GRANULOCYTES NFR BLD AUTO: 0.4 % (ref 0–0.5)
LDLC SERPL CALC-MCNC: 149 MG/DL (ref 0–100)
LYMPHOCYTES # BLD AUTO: 2.26 10*3/MM3 (ref 0.7–3.1)
LYMPHOCYTES NFR BLD AUTO: 25 % (ref 19.6–45.3)
MCH RBC QN AUTO: 30.6 PG (ref 26.6–33)
MCHC RBC AUTO-ENTMCNC: 33.6 G/DL (ref 31.5–35.7)
MCV RBC AUTO: 91.1 FL (ref 79–97)
MONOCYTES # BLD AUTO: 0.75 10*3/MM3 (ref 0.1–0.9)
MONOCYTES NFR BLD AUTO: 8.3 % (ref 5–12)
NEUTROPHILS # BLD AUTO: 5.65 10*3/MM3 (ref 1.7–7)
NEUTROPHILS NFR BLD AUTO: 62.4 % (ref 42.7–76)
NRBC BLD AUTO-RTO: 0 /100 WBC (ref 0–0.2)
PLATELET # BLD AUTO: 225 10*3/MM3 (ref 140–450)
POTASSIUM SERPL-SCNC: 4.3 MMOL/L (ref 3.5–5.2)
PROT SERPL-MCNC: 7.3 G/DL (ref 6–8.5)
PSA SERPL-MCNC: 1.85 NG/ML (ref 0–4)
RBC # BLD AUTO: 4.93 10*6/MM3 (ref 4.14–5.8)
SODIUM SERPL-SCNC: 140 MMOL/L (ref 136–145)
TESTOST SERPL-MCNC: 321 NG/DL (ref 264–916)
TRIGL SERPL-MCNC: 437 MG/DL (ref 0–150)
VLDLC SERPL CALC-MCNC: 82 MG/DL (ref 5–40)
WBC # BLD AUTO: 9.05 10*3/MM3 (ref 3.4–10.8)

## 2022-11-01 RX ORDER — AMOXICILLIN AND CLAVULANATE POTASSIUM 875; 125 MG/1; MG/1
1 TABLET, FILM COATED ORAL 2 TIMES DAILY
Qty: 14 TABLET | Refills: 0 | OUTPATIENT
Start: 2022-11-01

## 2022-11-03 RX ORDER — AMOXICILLIN AND CLAVULANATE POTASSIUM 875; 125 MG/1; MG/1
1 TABLET, FILM COATED ORAL 2 TIMES DAILY
Qty: 14 TABLET | Refills: 0 | OUTPATIENT
Start: 2022-11-03

## 2022-11-03 NOTE — TELEPHONE ENCOUNTER
Called and spoke to pt, he stated he thought he needed it but does not anymore. He said if he feels like he needs it he will give us a call.

## 2023-04-21 ENCOUNTER — OFFICE VISIT (OUTPATIENT)
Dept: FAMILY MEDICINE CLINIC | Facility: CLINIC | Age: 63
End: 2023-04-21
Payer: COMMERCIAL

## 2023-04-21 VITALS
BODY MASS INDEX: 30.21 KG/M2 | OXYGEN SATURATION: 98 % | HEIGHT: 69 IN | HEART RATE: 61 BPM | WEIGHT: 204 LBS | DIASTOLIC BLOOD PRESSURE: 92 MMHG | SYSTOLIC BLOOD PRESSURE: 149 MMHG | RESPIRATION RATE: 16 BRPM | TEMPERATURE: 97.7 F

## 2023-04-21 DIAGNOSIS — Z12.12 SCREENING FOR COLORECTAL CANCER: ICD-10-CM

## 2023-04-21 DIAGNOSIS — M25.512 CHRONIC LEFT SHOULDER PAIN: ICD-10-CM

## 2023-04-21 DIAGNOSIS — Z12.11 SCREENING FOR COLORECTAL CANCER: ICD-10-CM

## 2023-04-21 DIAGNOSIS — E66.3 OVERWEIGHT: ICD-10-CM

## 2023-04-21 DIAGNOSIS — G89.29 CHRONIC LEFT SHOULDER PAIN: ICD-10-CM

## 2023-04-21 DIAGNOSIS — E55.9 VITAMIN D DEFICIENCY: Primary | ICD-10-CM

## 2023-04-21 DIAGNOSIS — N52.9 ERECTILE DYSFUNCTION, UNSPECIFIED ERECTILE DYSFUNCTION TYPE: ICD-10-CM

## 2023-04-21 DIAGNOSIS — E78.2 MIXED HYPERLIPIDEMIA: ICD-10-CM

## 2023-04-21 RX ORDER — FENOFIBRATE 48 MG/1
48 TABLET, COATED ORAL DAILY
Qty: 90 TABLET | Refills: 2 | Status: SHIPPED | OUTPATIENT
Start: 2023-04-21

## 2023-04-21 NOTE — PROGRESS NOTES
"Chief Complaint  Hyperlipidemia (/Patient here for follow up. )    Subjective        Cyrus Landry presents to [facility]  History of Present Illness    Objective   Vital Signs:  /92 (BP Location: Left arm, Patient Position: Sitting, Cuff Size: Adult)   Pulse 61   Temp 97.7 °F (36.5 °C) (Infrared)   Resp 16   Ht 175.3 cm (69\")   Wt 92.5 kg (204 lb)   SpO2 98%   BMI 30.13 kg/m²   Estimated body mass index is 30.13 kg/m² as calculated from the following:    Height as of this encounter: 175.3 cm (69\").    Weight as of this encounter: 92.5 kg (204 lb).             Physical Exam   Result Review :                   Assessment and Plan   Diagnoses and all orders for this visit:    1. Vitamin D deficiency (Primary)    2. Mixed hyperlipidemia  -     Comprehensive metabolic panel    3. Overweight    4. Erectile dysfunction, unspecified erectile dysfunction type    5. Chronic left shoulder pain    6. Screening for colorectal cancer  -     Ambulatory Referral to Gastroenterology    Other orders  -     fenofibrate (TRICOR) 48 MG tablet; Take 1 tablet by mouth Daily.  Dispense: 90 tablet; Refill: 2        Plan:  2 months he is going to let us know about bp uncontrolled and we will start low dose tratment- he is apprehensive about starting this   Dash, increased activity, increased water  Cholesterol- stable- continue tricor  He will let us know if shoulder bothers him more and needs referal to pt or imaging  He will try to be more active   Will order gi referral for colonoscopy now that pt has insurance coverage           Follow Up   No follow-ups on file.  Patient was given instructions and counseling regarding his condition or for health maintenance advice. Please see specific information pulled into the AVS if appropriate.       Answers for HPI/ROS submitted by the patient on 4/21/2023  What is the primary reason for your visit?: Other  Please describe your symptoms.: Shoulder " discomfort  Have you had these symptoms before?: Yes  How long have you been having these symptoms?: Greater than 2 weeks  Please list any medications you are currently taking for this condition.: None  Please describe any probable cause for these symptoms. : Old age

## 2023-04-22 LAB
ALBUMIN SERPL-MCNC: 4.5 G/DL (ref 3.5–5.2)
ALBUMIN/GLOB SERPL: 1.7 G/DL
ALP SERPL-CCNC: 108 U/L (ref 39–117)
ALT SERPL-CCNC: 27 U/L (ref 1–41)
AST SERPL-CCNC: 20 U/L (ref 1–40)
BILIRUB SERPL-MCNC: 0.2 MG/DL (ref 0–1.2)
BUN SERPL-MCNC: 14 MG/DL (ref 8–23)
BUN/CREAT SERPL: 12.8 (ref 7–25)
CALCIUM SERPL-MCNC: 10.2 MG/DL (ref 8.6–10.5)
CHLORIDE SERPL-SCNC: 103 MMOL/L (ref 98–107)
CO2 SERPL-SCNC: 28.3 MMOL/L (ref 22–29)
CREAT SERPL-MCNC: 1.09 MG/DL (ref 0.76–1.27)
EGFRCR SERPLBLD CKD-EPI 2021: 75.8 ML/MIN/1.73
GLOBULIN SER CALC-MCNC: 2.7 GM/DL
GLUCOSE SERPL-MCNC: 122 MG/DL (ref 65–99)
POTASSIUM SERPL-SCNC: 4.3 MMOL/L (ref 3.5–5.2)
PROT SERPL-MCNC: 7.2 G/DL (ref 6–8.5)
SODIUM SERPL-SCNC: 140 MMOL/L (ref 136–145)

## 2023-05-09 RX ORDER — FENOFIBRATE 48 MG/1
48 TABLET, COATED ORAL DAILY
Qty: 90 TABLET | Refills: 2 | Status: SHIPPED | OUTPATIENT
Start: 2023-05-09

## 2023-05-09 NOTE — TELEPHONE ENCOUNTER
Rx Refill Note  Requested Prescriptions     Pending Prescriptions Disp Refills   • fenofibrate (TRICOR) 48 MG tablet [Pharmacy Med Name: FENOFIBRATE 48 MG TAB 48 Tablet] 90 tablet 2     Sig: Take 1 tablet by mouth Daily.      Last office visit with prescribing clinician: 4/21/2023   Last telemedicine visit with prescribing clinician: 4/21/2023   Next office visit with prescribing clinician: Visit date not found                         Would you like a call back once the refill request has been completed: [] Yes [] No    If the office needs to give you a call back, can they leave a voicemail: [] Yes [] No    Jessica Mckeon CMA  05/09/23, 10:35 CDT

## 2023-06-09 ENCOUNTER — TELEPHONE (OUTPATIENT)
Dept: FAMILY MEDICINE CLINIC | Facility: CLINIC | Age: 63
End: 2023-06-09
Payer: COMMERCIAL

## 2023-06-09 NOTE — TELEPHONE ENCOUNTER
Pt called back and wants to hold off for now due to being busy at work, He stated he would call later on to scheduled appt. Will close referral for now but can be re-opened if patient wishes to schedule an appt.

## 2023-07-07 ENCOUNTER — TELEPHONE (OUTPATIENT)
Dept: GASTROENTEROLOGY | Facility: CLINIC | Age: 63
End: 2023-07-07

## 2023-07-07 NOTE — TELEPHONE ENCOUNTER
"  Caller: Cyrus Landry \"NICK\"    Relationship: Self    Best call back number: [phone]     What is the best time to reach you: ANYTIME    Who are you requesting to speak with (clinical staff, provider,  specific staff member): NON CLINICAL    Do you know the name of the person who called:     What was the call regarding: CALLING TO CONFIRM APPOINTMENT 7/10/23 AT 9:15 AM    Is it okay if the provider responds through MyChart:         "

## 2023-07-10 NOTE — H&P (VIEW-ONLY)
Chief Complaint   Patient presents with    Colonoscopy     Had colon years ago in [facility] colon       PCP: Magno Sawyer APRN  REFER: Magno Sawyer APRN    Subjective     HPI    Cyrus Landry is a 62 y.o. male who presents to office for preventative maintenance.  There is not  a personal history of colon polyps.  There is not a history of colon cancer.  He does not have complaints of nausea/vomiting, change in bowels, weight loss, no BRBPR, no melena.  There is not a family history of colon cancer in first degree relative.  There is not a family history of colon polyps in first degree relative.  Cyrus Landry last colonoscopy-2010 .  Bowels do move on regular basis.    SCANNED - COLONOSCOPY (10/08/2010) -normal     Lab Results - Last 18 Months   Lab Units 04/21/23  1428 10/21/22  1459 04/08/22  1257   GLUCOSE mg/dL 122* 96 100*   SODIUM mmol/L 140 140 138   POTASSIUM mmol/L 4.3 4.3 4.7   CREATININE mg/dL 1.09 1.01 1.13   BUN mg/dL 14 13 14   BUN / CREAT RATIO  12.8 12.9 12.4   ALK PHOS U/L 108 126* 115   ALT (SGPT) U/L 27 45* 35   AST (SGOT) U/L 20 33 24   BILIRUBIN mg/dL 0.2 0.4 0.3   ALBUMIN g/dL 4.5 4.80 4.80       Lab Results - Last 18 Months   Lab Units 04/21/23  1428 10/21/22  1459 04/08/22  1257   EGFR RESULT mL/min/1.73 75.8 83.0 73.0        Past Medical History:   Diagnosis Date    Gastric reflux     Hyperlipidemia     Inguinal hernia      Past Surgical History:   Procedure Laterality Date    COLONOSCOPY      INGUINAL HERNIA REPAIR Bilateral 9/9/2016    Procedure: BILATERAL INGUINAL HERNIA REPAIR LAPAROSCOPIC WITH MESH;  Surgeon: Mustapha Zelaya MD;  Location: [facility] OR [facility];  Service:     TONSILLECTOMY       Outpatient Medications Marked as Taking for the 7/10/23 encounter (Office Visit) with Lamin Morgan APRN   Medication Sig Dispense Refill    fenofibrate (TRICOR) 48 MG tablet Take 1 tablet by mouth Daily. 90 tablet 2    Multiple Vitamins-Minerals (MULTIVITAMIN ADULT) tablet  Take 1 tablet by mouth Daily.      valACYclovir (VALTREX) 1000 MG tablet Take 1 tablet by mouth 2 (Two) Times a Day. 14 tablet 2     No Known Allergies  Social History     Socioeconomic History    Marital status:    Tobacco Use    Smoking status: Never    Smokeless tobacco: Never   Vaping Use    Vaping Use: Never used   Substance and Sexual Activity    Alcohol use: Yes     Comment: rare    Drug use: No    Sexual activity: Defer     Review of Systems   Constitutional:  Negative for fever and unexpected weight change.   HENT:  Negative for trouble swallowing.    Respiratory:  Negative for shortness of breath.    Cardiovascular:  Negative for chest pain.   Gastrointestinal:  Negative for abdominal pain and anal bleeding.   Objective   Vitals:    07/10/23 0942   BP: 150/90   Pulse: 70   Temp: 98 °F (36.7 °C)   SpO2: 97%     Physical Exam  Constitutional:       Appearance: Normal appearance. He is well-developed.   Eyes:      General: No scleral icterus.  Cardiovascular:      Heart sounds: Normal heart sounds. No murmur heard.  Pulmonary:      Effort: Pulmonary effort is normal.   Abdominal:      General: Bowel sounds are normal. There is no distension.      Palpations: Abdomen is soft.      Tenderness: There is no abdominal tenderness. There is no guarding.   Skin:     General: Skin is warm and dry.      Coloration: Skin is not jaundiced.   Neurological:      Mental Status: He is alert.   Psychiatric:         Behavior: Behavior is cooperative.     Imaging Results (Most Recent)       None          Body mass index is 29.53 kg/m².    Assessment & Plan   Diagnoses and all orders for this visit:    1. Encounter for screening for malignant neoplasm of colon (Primary)  -     Case Request; Standing  -     Case Request    Other orders  -     Implement Anesthesia Orders Day of Procedure; Standing  -     Obtain Informed Consent; Standing  -     sodium-potassium-magnesium sulfates (Suprep Bowel Prep Kit) 17.5-3.13-1.6  GM/177ML solution oral solution; Take 1 bottle by mouth Take As Directed. Take as directed  Dispense: 177 mL; Refill: 0      COLONOSCOPY WITH ANESTHESIA (N/A)        Advised pt to stop use of NSAIDs, Fish Oil, and MV 5 days prior to procedure, per Dr Freeman protocol.  Tylenol based products are ok to take.  Pt verbalized understanding.     All risks, benefits, alternatives, and indications of colonoscopy procedure have been discussed with the patient. Risks to include perforation of the colon requiring possible surgery or colostomy, risk of bleeding from biopsies or removal of colon tissue, possibility of missing a colon polyp or cancer, or adverse drug reaction.  Benefits to include the diagnosis and management of disease of the colon and rectum. Alternatives to include barium enema, radiographic evaluation, lab testing or no intervention. He verbalizes understanding and agrees.         Lamin Morgan, APRN  07/10/23        There are no Patient Instructions on file for this visit.

## 2023-07-26 ENCOUNTER — ANESTHESIA (OUTPATIENT)
Dept: GASTROENTEROLOGY | Facility: HOSPITAL | Age: 63
End: 2023-07-26
Payer: COMMERCIAL

## 2023-07-26 ENCOUNTER — HOSPITAL ENCOUNTER (OUTPATIENT)
Facility: HOSPITAL | Age: 63
Setting detail: HOSPITAL OUTPATIENT SURGERY
Discharge: HOME | End: 2023-07-26
Attending: INTERNAL MEDICINE | Admitting: INTERNAL MEDICINE
Payer: COMMERCIAL

## 2023-07-26 ENCOUNTER — ANESTHESIA EVENT (OUTPATIENT)
Dept: GASTROENTEROLOGY | Facility: HOSPITAL | Age: 63
End: 2023-07-26
Payer: COMMERCIAL

## 2023-07-26 VITALS
RESPIRATION RATE: 10 BRPM | HEART RATE: 63 BPM | DIASTOLIC BLOOD PRESSURE: 81 MMHG | SYSTOLIC BLOOD PRESSURE: 117 MMHG | BODY MASS INDEX: 27.49 KG/M2 | OXYGEN SATURATION: 96 % | HEIGHT: 70 IN | TEMPERATURE: 97.1 F | WEIGHT: 192 LBS

## 2023-07-26 PROCEDURE — 45378 DIAGNOSTIC COLONOSCOPY: CPT | Performed by: INTERNAL MEDICINE

## 2023-07-26 PROCEDURE — 25010000002 PROPOFOL 10 MG/ML EMULSION: Performed by: NURSE ANESTHETIST, CERTIFIED REGISTERED

## 2023-07-26 RX ORDER — SODIUM CHLORIDE 9 MG/ML
500 INJECTION, SOLUTION INTRAVENOUS CONTINUOUS PRN
Status: DISCONTINUED | OUTPATIENT
Start: 2023-07-26 | End: 2023-07-26 | Stop reason: HOSPADM

## 2023-07-26 RX ORDER — PROPOFOL 10 MG/ML
VIAL (ML) INTRAVENOUS AS NEEDED
Status: DISCONTINUED | OUTPATIENT
Start: 2023-07-26 | End: 2023-07-26 | Stop reason: SURG

## 2023-07-26 RX ADMIN — PROPOFOL INJECTABLE EMULSION 100 MG: 10 INJECTION, EMULSION INTRAVENOUS at 10:51

## 2023-07-26 RX ADMIN — PROPOFOL INJECTABLE EMULSION 100 MG: 10 INJECTION, EMULSION INTRAVENOUS at 10:47

## 2023-07-26 RX ADMIN — SODIUM CHLORIDE 500 ML: 9 INJECTION, SOLUTION INTRAVENOUS at 09:58

## 2023-07-26 NOTE — ANESTHESIA POSTPROCEDURE EVALUATION
Patient: Cyrus Landry    Procedure Summary       Date: 07/26/23 Room / Location: [facility] ENDOSCOPY 5 / [facility]    Anesthesia Start: 1046 Anesthesia Stop: 1103    Procedure: COLONOSCOPY WITH ANESTHESIA Diagnosis:       Encounter for screening for malignant neoplasm of colon      (Encounter for screening for malignant neoplasm of colon [Z12.11])    Surgeons: Joey Freeman DO Provider: Noman Grijalva CRNA    Anesthesia Type: MAC ASA Status: 2            Anesthesia Type: MAC    Vitals  Vitals Value Taken Time   /72 07/26/23 1111   Temp     Pulse 63 07/26/23 1115   Resp 11 07/26/23 1105   SpO2 96 % 07/26/23 1115   Vitals shown include unvalidated device data.        Post Anesthesia Care and Evaluation    Patient location during evaluation: PHASE II  Patient participation: complete - patient participated  Level of consciousness: awake and alert  Pain management: adequate    Airway patency: patent  Anesthetic complications: No anesthetic complications  PONV Status: none  Cardiovascular status: acceptable and stable  Respiratory status: acceptable and unassisted  Hydration status: acceptable

## 2023-07-26 NOTE — ANESTHESIA PREPROCEDURE EVALUATION
Anesthesia Evaluation     no history of anesthetic complications:   NPO Solid Status: > 8 hours  NPO Liquid Status: > 4 hours           Airway   Mallampati: I  TM distance: >3 FB  No difficulty expected  Dental - normal exam     Pulmonary - negative pulmonary ROS   Cardiovascular   Exercise tolerance: excellent (>7 METS)    (+) hyperlipidemia      Neuro/Psych- negative ROS  GI/Hepatic/Renal/Endo - negative ROS     Musculoskeletal (-) negative ROS    Abdominal    Substance History      OB/GYN          Other                      Anesthesia Plan    ASA 2     MAC       Anesthetic plan, risks, benefits, and alternatives have been provided, discussed and informed consent has been obtained with: patient.    CODE STATUS:

## 2023-08-10 ENCOUNTER — TELEPHONE (OUTPATIENT)
Dept: GASTROENTEROLOGY | Facility: CLINIC | Age: 63
End: 2023-08-10
Payer: COMMERCIAL

## 2024-02-29 RX ORDER — VALACYCLOVIR HYDROCHLORIDE 1 G/1
1000 TABLET, FILM COATED ORAL 2 TIMES DAILY
Qty: 14 TABLET | Refills: 2 | Status: SHIPPED | OUTPATIENT
Start: 2024-02-29

## 2024-02-29 NOTE — TELEPHONE ENCOUNTER
Rx Refill Note  Requested Prescriptions     Pending Prescriptions Disp Refills    valACYclovir (VALTREX) 1000 MG tablet [Pharmacy Med Name: VALACYCLOVIR HCL 1 GM TABS 1 Tablet] 14 tablet 2     Sig: Take 1 tablet by mouth 2 (Two) Times a Day.      Last office visit with prescribing clinician: 4/21/2023   Next office visit with prescribing clinician: Visit date not found       Gala Covarrubias CMA  02/29/24, 08:38 CST

## 2024-05-20 ENCOUNTER — OFFICE VISIT (OUTPATIENT)
Dept: FAMILY MEDICINE CLINIC | Facility: CLINIC | Age: 64
End: 2024-05-20
Payer: MEDICARE

## 2024-05-20 ENCOUNTER — TELEPHONE (OUTPATIENT)
Dept: FAMILY MEDICINE CLINIC | Facility: CLINIC | Age: 64
End: 2024-05-20

## 2024-05-20 VITALS
WEIGHT: 197 LBS | TEMPERATURE: 98.7 F | RESPIRATION RATE: 20 BRPM | OXYGEN SATURATION: 97 % | SYSTOLIC BLOOD PRESSURE: 137 MMHG | HEART RATE: 50 BPM | BODY MASS INDEX: 28.2 KG/M2 | DIASTOLIC BLOOD PRESSURE: 78 MMHG | HEIGHT: 70 IN

## 2024-05-20 DIAGNOSIS — N52.9 ERECTILE DYSFUNCTION, UNSPECIFIED ERECTILE DYSFUNCTION TYPE: ICD-10-CM

## 2024-05-20 DIAGNOSIS — R00.1 BRADYCARDIA: ICD-10-CM

## 2024-05-20 DIAGNOSIS — Z12.5 SCREENING PSA (PROSTATE SPECIFIC ANTIGEN): ICD-10-CM

## 2024-05-20 DIAGNOSIS — E55.9 VITAMIN D DEFICIENCY: Primary | ICD-10-CM

## 2024-05-20 DIAGNOSIS — E78.5 HYPERLIPIDEMIA, UNSPECIFIED HYPERLIPIDEMIA TYPE: ICD-10-CM

## 2024-05-20 PROCEDURE — 1160F RVW MEDS BY RX/DR IN RCRD: CPT | Performed by: NURSE PRACTITIONER

## 2024-05-20 PROCEDURE — 1159F MED LIST DOCD IN RCRD: CPT | Performed by: NURSE PRACTITIONER

## 2024-05-20 PROCEDURE — 99214 OFFICE O/P EST MOD 30 MIN: CPT | Performed by: NURSE PRACTITIONER

## 2024-05-20 PROCEDURE — G0439 PPPS, SUBSEQ VISIT: HCPCS | Performed by: NURSE PRACTITIONER

## 2024-05-20 PROCEDURE — 1126F AMNT PAIN NOTED NONE PRSNT: CPT | Performed by: NURSE PRACTITIONER

## 2024-05-20 RX ORDER — SILDENAFIL CITRATE 20 MG/1
20 TABLET ORAL DAILY PRN
Qty: 5 TABLET | Refills: 2 | Status: SHIPPED | OUTPATIENT
Start: 2024-05-20

## 2024-05-20 RX ORDER — FENOFIBRATE 48 MG/1
48 TABLET, COATED ORAL DAILY
Qty: 90 TABLET | Refills: 3 | Status: SHIPPED | OUTPATIENT
Start: 2024-05-20

## 2024-05-20 NOTE — PROGRESS NOTES
"Chief Complaint  Welcome To Medicare and Erectile Dysfunction    Subjective        Cyrus Landry presents to [facility]  History of Present Illness  Patient here yearly follow up and labs.   Denies any new complaints or concerns.   Reports occasional left wrist pain, but is not interested in treatment.   ED stable with occasional use of sildenafil  Reports heart rate has been getting low- denies any current symptoms with this. Reports mother had low heart rate as well.     Objective   Vital Signs:  /78 (BP Location: Left arm, Patient Position: Sitting, Cuff Size: Adult)   Pulse 50   Temp 98.7 °F (37.1 °C) (Infrared)   Resp 20   Ht 177.8 cm (70\")   Wt 89.4 kg (197 lb)   SpO2 97%   BMI 28.27 kg/m²   Estimated body mass index is 28.27 kg/m² as calculated from the following:    Height as of this encounter: 177.8 cm (70\").    Weight as of this encounter: 89.4 kg (197 lb).               Physical Exam  Vitals and nursing note reviewed.   Constitutional:       General: He is not in acute distress.     Appearance: Normal appearance. He is well-developed.   HENT:      Right Ear: Tympanic membrane and ear canal normal.      Left Ear: Tympanic membrane and ear canal normal.      Nose: Nose normal.      Right Sinus: No maxillary sinus tenderness or frontal sinus tenderness.      Left Sinus: No maxillary sinus tenderness or frontal sinus tenderness.      Mouth/Throat:      Mouth: Mucous membranes are moist.      Pharynx: Oropharynx is clear. Uvula midline. No uvula swelling.   Eyes:      Conjunctiva/sclera: Conjunctivae normal.      Pupils: Pupils are equal, round, and reactive to light.   Neck:      Thyroid: No thyromegaly.      Trachea: No tracheal deviation.   Cardiovascular:      Rate and Rhythm: Regular rhythm. Bradycardia present. Occasional Extrasystoles are present.     Pulses: Normal pulses.   Pulmonary:      Effort: Pulmonary effort is normal.      Breath sounds: Normal " breath sounds.   Abdominal:      General: Bowel sounds are normal.   Musculoskeletal:         General: Normal range of motion.      Cervical back: Neck supple.   Lymphadenopathy:      Cervical: No cervical adenopathy.   Skin:     General: Skin is warm and dry.      Capillary Refill: Capillary refill takes less than 2 seconds.   Neurological:      Mental Status: He is alert and oriented to person, place, and time.   Psychiatric:         Behavior: Behavior normal.        Physical Exam      Result Review :          Results        ECG 12 Lead    Date/Time: 6/3/2024 10:36 PM  Performed by: Magno Sawyer APRN    Authorized by: Magno Sawyer APRN  Rhythm: sinus arrhythmia  Rate: normal  QRS axis: normal  Other findings: T wave abnormality    Clinical impression: abnormal EKG            Assessment and Plan     Diagnoses and all orders for this visit:    1. Vitamin D deficiency (Primary)  -     Vitamin D 25 hydroxy    2. Hyperlipidemia, unspecified hyperlipidemia type  -     Lipid panel  -     CBC w AUTO Differential  -     Comprehensive metabolic panel    3. Screening PSA (prostate specific antigen)  -     PSA SCREENING    4. Bradycardia  -     ECG 12 Lead    5. Erectile dysfunction, unspecified erectile dysfunction type    Other orders  -     fenofibrate (TRICOR) 48 MG tablet; Take 1 tablet by mouth Daily.  Dispense: 90 tablet; Refill: 3  -     sildenafil (REVATIO) 20 MG tablet; Take 1 tablet by mouth Daily As Needed (ED).  Dispense: 5 tablet; Refill: 2      Assessment & Plan  Hyperlipidemia  Controlled with tricor. Encouraged diet efforts. Recheck labs today.    2. Vit d deficiency  Recheck, continue supplementation    3. ED  Chronic. Controlled with sildenafil.     4. Bradycardia  EKG today, if concerning, will recommend additional evaluation. If borderline, pt will let me know if any new concerning symptoms regarding cardiac. Encouraged if chest pain, recommend to go to er asap.        Follow Up     Return  in about 1 year (around 5/20/2025) for Medicare Wellness, Recheck.  Patient was given instructions and counseling regarding his condition or for health maintenance advice. Please see specific information pulled into the AVS if appropriate.       Patient or patient representative verbalized consent for the use of Ambient Listening during the visit with  MÓNICA Evans for chart documentation. 6/3/2024  22:38 CDT

## 2024-05-20 NOTE — PROGRESS NOTES
The ABCs of the Annual Wellness Visit  [place] to Medicare Visit    Subjective     Cyrus Landry is a 63 y.o. male who presents for a  Welcome to Medicare Visit.    The following portions of the patient's history were reviewed and   updated as appropriate: allergies, current medications, past family history, past medical history, past social history, past surgical history, and problem list.     Compared to one year ago, the patient feels his physical   health is the same.    Compared to one year ago, the patient feels his mental   health is the same.    Recent Hospitalizations:  He was not admitted to the hospital during the last year.       Current Medical Providers:  Patient Care Team:  Magno Sawyer APRN as PCP - General (Family Medicine)    Outpatient Medications Prior to Visit   Medication Sig Dispense Refill    Cholecalciferol (Vitamin D-3) 25 MCG (1000 UT) capsule Take 1,000 Units by mouth Daily.  0    fenofibrate (TRICOR) 48 MG tablet Take 1 tablet by mouth Daily. 90 tablet 2    Multiple Vitamins-Minerals (MULTIVITAMIN ADULT) tablet Take 1 tablet by mouth Daily.      sildenafil (REVATIO) 20 MG tablet Take 1 tablet by mouth Daily As Needed (ED). 5 tablet 2    valACYclovir (VALTREX) 1000 MG tablet Take 1 tablet by mouth 2 (Two) Times a Day. 14 tablet 2    sodium-potassium-magnesium sulfates (Suprep Bowel Prep Kit) 17.5-3.13-1.6 GM/177ML solution oral solution Take 1 bottle by mouth Take As Directed. Take as directed (Patient not taking: Reported on 5/20/2024) 177 mL 0     No facility-administered medications prior to visit.       No opioid medication identified on active medication list. I have reviewed chart for other potential  high risk medication/s and harmful drug interactions in the elderly.        Aspirin is not on active medication list.  Aspirin use is not indicated based on review of current medical condition/s. Risk of harm outweighs potential benefits.  .    Patient Active Problem List  "  Diagnosis    Hyperlipidemia    Well adult exam    Hyperglycemia    Overweight    Lateral epicondylitis of both elbows    Right carpal tunnel syndrome    Vasculogenic erectile dysfunction    Vitamin D deficiency    H/O cold sores     Advance Care Planning   Advance Care Planning     Advance Directive is not on file.  ACP discussion was held with the patient during this visit. Patient does not have an advance directive, information provided.       Objective   Vitals:    24 0944   BP: 137/78   BP Location: Left arm   Patient Position: Sitting   Cuff Size: Adult   Pulse: 50   Resp: 20   Temp: 98.7 °F (37.1 °C)   TempSrc: Infrared   SpO2: 97%   Weight: 89.4 kg (197 lb)   Height: 177.8 cm (70\")   PainSc: 0-No pain     Estimated body mass index is 28.27 kg/m² as calculated from the following:    Height as of this encounter: 177.8 cm (70\").    Weight as of this encounter: 89.4 kg (197 lb).           Does the patient have evidence of cognitive impairment?   No         Procedures       HEALTH RISK ASSESSMENT    Smoking Status:  Social History     Tobacco Use   Smoking Status Never   Smokeless Tobacco Never     Alcohol Consumption:  Social History     Substance and Sexual Activity   Alcohol Use Yes    Comment: rare       Fall Risk Screen:    STEADI Fall Risk Assessment was completed, and patient is at LOW risk for falls.Assessment completed on:2024    Depression Screen:       2024     9:38 AM   PHQ-2/PHQ-9 Depression Screening   Little Interest or Pleasure in Doing Things 0-->not at all   Feeling Down, Depressed or Hopeless 0-->not at all   PHQ-9: Brief Depression Severity Measure Score 0       Health Habits and Functional and Cognitive Screenin/20/2024     9:39 AM   Functional & Cognitive Status   Do you have difficulty preparing food and eating? No   Do you have difficulty bathing yourself, getting dressed or grooming yourself? No   Do you have difficulty using the toilet? No   Do you have " difficulty moving around from place to place? No   Do you have trouble with steps or getting out of a bed or a chair? No   Current Diet Well Balanced Diet   Dental Exam Up to date   Eye Exam Up to date   Exercise (times per week) 3 times per week   Current Exercises Include Walking   Do you need help using the phone?  No   Are you deaf or do you have serious difficulty hearing?  No   Do you need help to go to places out of walking distance? No   Do you need help shopping? No   Do you need help preparing meals?  No   Do you need help with housework?  No   Do you need help with laundry? No   Do you need help taking your medications? No   Do you need help managing money? No   Do you ever drive or ride in a car without wearing a seat belt? No   Have you felt unusual stress, anger or loneliness in the last month? No   Who do you live with? Alone   If you need help, do you have trouble finding someone available to you? No   Have you been bothered in the last four weeks by sexual problems? No   Do you have difficulty concentrating, remembering or making decisions? No       Visual Acuity:    No results found.    Age-appropriate Screening Schedule:  Refer to the list below for future screening recommendations based on patient's age, sex and/or medical conditions. Orders for these recommended tests are listed in the plan section. The patient has been provided with a written plan.    Health Maintenance   Topic Date Due    TDAP/TD VACCINES (1 - Tdap) Never done    RSV Vaccine - Adults (1 - 1-dose 60+ series) Never done    COVID-19 Vaccine (2 - 2023-24 season) 09/01/2023    Pneumococcal Vaccine 65+ (1 of 1 - PCV) Never done    LIPID PANEL  10/21/2023    INFLUENZA VACCINE  08/01/2024    ANNUAL WELLNESS VISIT  05/20/2025    BMI FOLLOWUP  05/20/2025    COLORECTAL CANCER SCREENING  07/26/2033    HEPATITIS C SCREENING  Completed    ZOSTER VACCINE  Completed        CMS Preventative Services Quick Reference  Risk Factors Identified  During Encounter    Vision Screening Recommended  The above risks/problems have been discussed with the patient.  Pertinent information has been shared with the patient in the After Visit Summary.  Follow up plans and orders are seen below in the Assessment/Plan Section.    There are no diagnoses linked to this encounter.    Follow Up:   Initial Medicare Visit in one year    An After Visit Summary and PPPS were made available to the patient.

## 2024-05-21 LAB
25(OH)D3+25(OH)D2 SERPL-MCNC: 27.6 NG/ML (ref 30–100)
ALBUMIN SERPL-MCNC: 4.4 G/DL (ref 3.5–5.2)
ALBUMIN/GLOB SERPL: 1.9 G/DL
ALP SERPL-CCNC: 126 U/L (ref 39–117)
ALT SERPL-CCNC: 25 U/L (ref 1–41)
AST SERPL-CCNC: 28 U/L (ref 1–40)
BASOPHILS # BLD AUTO: 0.09 10*3/MM3 (ref 0–0.2)
BASOPHILS NFR BLD AUTO: 1 % (ref 0–1.5)
BILIRUB SERPL-MCNC: 0.3 MG/DL (ref 0–1.2)
BUN SERPL-MCNC: 16 MG/DL (ref 8–23)
BUN/CREAT SERPL: 15.5 (ref 7–25)
CALCIUM SERPL-MCNC: 9.8 MG/DL (ref 8.6–10.5)
CHLORIDE SERPL-SCNC: 104 MMOL/L (ref 98–107)
CHOLEST SERPL-MCNC: 188 MG/DL (ref 0–200)
CO2 SERPL-SCNC: 24.5 MMOL/L (ref 22–29)
CREAT SERPL-MCNC: 1.03 MG/DL (ref 0.76–1.27)
EGFRCR SERPLBLD CKD-EPI 2021: 80.6 ML/MIN/1.73
EOSINOPHIL # BLD AUTO: 0.17 10*3/MM3 (ref 0–0.4)
EOSINOPHIL NFR BLD AUTO: 2 % (ref 0.3–6.2)
ERYTHROCYTE [DISTWIDTH] IN BLOOD BY AUTOMATED COUNT: 13.1 % (ref 12.3–15.4)
GLOBULIN SER CALC-MCNC: 2.3 GM/DL
GLUCOSE SERPL-MCNC: 86 MG/DL (ref 65–99)
HCT VFR BLD AUTO: 45.8 % (ref 37.5–51)
HDLC SERPL-MCNC: 38 MG/DL (ref 40–60)
HGB BLD-MCNC: 15.1 G/DL (ref 13–17.7)
IMM GRANULOCYTES # BLD AUTO: 0.03 10*3/MM3 (ref 0–0.05)
IMM GRANULOCYTES NFR BLD AUTO: 0.3 % (ref 0–0.5)
LDLC SERPL CALC-MCNC: 115 MG/DL (ref 0–100)
LYMPHOCYTES # BLD AUTO: 2.18 10*3/MM3 (ref 0.7–3.1)
LYMPHOCYTES NFR BLD AUTO: 25.1 % (ref 19.6–45.3)
MCH RBC QN AUTO: 30.2 PG (ref 26.6–33)
MCHC RBC AUTO-ENTMCNC: 33 G/DL (ref 31.5–35.7)
MCV RBC AUTO: 91.6 FL (ref 79–97)
MONOCYTES # BLD AUTO: 0.81 10*3/MM3 (ref 0.1–0.9)
MONOCYTES NFR BLD AUTO: 9.3 % (ref 5–12)
NEUTROPHILS # BLD AUTO: 5.41 10*3/MM3 (ref 1.7–7)
NEUTROPHILS NFR BLD AUTO: 62.3 % (ref 42.7–76)
NRBC BLD AUTO-RTO: 0 /100 WBC (ref 0–0.2)
PLATELET # BLD AUTO: 203 10*3/MM3 (ref 140–450)
POTASSIUM SERPL-SCNC: 4.4 MMOL/L (ref 3.5–5.2)
PROT SERPL-MCNC: 6.7 G/DL (ref 6–8.5)
PSA SERPL-MCNC: 1.27 NG/ML (ref 0–4)
RBC # BLD AUTO: 5 10*6/MM3 (ref 4.14–5.8)
SODIUM SERPL-SCNC: 138 MMOL/L (ref 136–145)
TRIGL SERPL-MCNC: 196 MG/DL (ref 0–150)
VLDLC SERPL CALC-MCNC: 35 MG/DL (ref 5–40)
WBC # BLD AUTO: 8.69 10*3/MM3 (ref 3.4–10.8)

## 2024-06-03 PROCEDURE — 93000 ELECTROCARDIOGRAM COMPLETE: CPT | Performed by: NURSE PRACTITIONER

## 2025-02-21 RX ORDER — FENOFIBRATE 48 MG/1
48 TABLET, COATED ORAL DAILY
Qty: 90 TABLET | Refills: 3 | OUTPATIENT
Start: 2025-02-21

## 2025-03-28 RX ORDER — VALACYCLOVIR HYDROCHLORIDE 1 G/1
1000 TABLET, FILM COATED ORAL 2 TIMES DAILY
Qty: 14 TABLET | Refills: 2 | Status: SHIPPED | OUTPATIENT
Start: 2025-03-28

## 2025-03-28 NOTE — TELEPHONE ENCOUNTER
Rx Refill Note  Requested Prescriptions     Pending Prescriptions Disp Refills    valACYclovir (VALTREX) 1000 MG tablet 14 tablet 2     Sig: Take 1 tablet by mouth 2 (Two) Times a Day.      Last office visit with prescribing clinician: 5/20/2024   Last telemedicine visit with prescribing clinician: Visit date not found   Next office visit with prescribing clinician: 5/23/2025     Geovanni Dumont MA  03/28/25, 16:27 CDT

## 2025-05-23 ENCOUNTER — OFFICE VISIT (OUTPATIENT)
Dept: FAMILY MEDICINE CLINIC | Facility: CLINIC | Age: 65
End: 2025-05-23
Payer: MEDICARE

## 2025-05-23 VITALS
SYSTOLIC BLOOD PRESSURE: 139 MMHG | WEIGHT: 198 LBS | BODY MASS INDEX: 29.33 KG/M2 | DIASTOLIC BLOOD PRESSURE: 81 MMHG | OXYGEN SATURATION: 95 % | TEMPERATURE: 98.2 F | HEART RATE: 60 BPM | HEIGHT: 69 IN | RESPIRATION RATE: 20 BRPM

## 2025-05-23 DIAGNOSIS — Z00.00 MEDICARE ANNUAL WELLNESS VISIT, INITIAL: Primary | ICD-10-CM

## 2025-05-23 DIAGNOSIS — Z12.5 SCREENING PSA (PROSTATE SPECIFIC ANTIGEN): ICD-10-CM

## 2025-05-23 DIAGNOSIS — E78.5 HYPERLIPIDEMIA, UNSPECIFIED HYPERLIPIDEMIA TYPE: ICD-10-CM

## 2025-05-23 DIAGNOSIS — L65.9 HAIR LOSS: ICD-10-CM

## 2025-05-23 DIAGNOSIS — E66.3 OVERWEIGHT: ICD-10-CM

## 2025-05-23 DIAGNOSIS — E55.9 VITAMIN D DEFICIENCY: ICD-10-CM

## 2025-05-23 RX ORDER — SILDENAFIL CITRATE 20 MG/1
20 TABLET ORAL DAILY PRN
Qty: 5 TABLET | Refills: 2 | Status: SHIPPED | OUTPATIENT
Start: 2025-05-23

## 2025-05-23 RX ORDER — MINOXIDIL 50 MG/G
0.5 AEROSOL, FOAM TOPICAL 2 TIMES DAILY
Qty: 60 G | Refills: 4 | Status: SHIPPED | OUTPATIENT
Start: 2025-05-23

## 2025-05-23 RX ORDER — FENOFIBRATE 48 MG/1
48 TABLET, FILM COATED ORAL DAILY
Qty: 90 TABLET | Refills: 3 | Status: SHIPPED | OUTPATIENT
Start: 2025-05-23 | End: 2025-06-04 | Stop reason: DRUGHIGH

## 2025-05-23 NOTE — PROGRESS NOTES
Subjective   The ABCs of the Annual Wellness Visit  Medicare Wellness Visit      Cyrus Landry is a 64 y.o. patient who presents for a Medicare Wellness Visit.    The following portions of the patient's history were reviewed and   updated as appropriate: allergies, current medications, past family history, past medical history, past social history, past surgical history, and problem list.    Compared to one year ago, the patient's physical   health is the same.  Compared to one year ago, the patient's mental   health is the same.    Recent Hospitalizations:  He was not admitted to the hospital during the last year.     Current Medical Providers:  Patient Care Team:  Magno Sawyer APRN as PCP - General (Family Medicine)    Outpatient Medications Prior to Visit   Medication Sig Dispense Refill    Cholecalciferol (Vitamin D-3) 25 MCG (1000 UT) capsule Take 1,000 Units by mouth Daily.  0    Multiple Vitamins-Minerals (MULTIVITAMIN ADULT) tablet Take 1 tablet by mouth Daily.      valACYclovir (VALTREX) 1000 MG tablet Take 1 tablet by mouth 2 (Two) Times a Day. 14 tablet 2    fenofibrate (TRICOR) 48 MG tablet Take 1 tablet by mouth Daily. 90 tablet 3    sildenafil (REVATIO) 20 MG tablet Take 1 tablet by mouth Daily As Needed (ED). 5 tablet 2     No facility-administered medications prior to visit.     No opioid medication identified on active medication list. I have reviewed chart for other potential  high risk medication/s and harmful drug interactions in the elderly.      Aspirin is not on active medication list.  Aspirin use is not indicated based on review of current medical condition/s. Risk of harm outweighs potential benefits.  .    Patient Active Problem List   Diagnosis    Hyperlipidemia    Well adult exam    Hyperglycemia    Overweight    Lateral epicondylitis of both elbows    Right carpal tunnel syndrome    Vasculogenic erectile dysfunction    Vitamin D deficiency    H/O cold sores     Advance Care  "Planning Advance Directive is not on file.  ACP discussion was held with the patient during this visit. Patient does not have an advance directive, information provided.            Objective   Vitals:    25 0814   BP: 139/81   BP Location: Left arm   Patient Position: Sitting   Cuff Size: Adult   Pulse: 60   Resp: 20   Temp: 98.2 °F (36.8 °C)   TempSrc: Infrared   SpO2: 95%   Weight: 89.8 kg (198 lb)   Height: 175.3 cm (69\")   PainSc: 0-No pain       Estimated body mass index is 29.24 kg/m² as calculated from the following:    Height as of this encounter: 175.3 cm (69\").    Weight as of this encounter: 89.8 kg (198 lb).    BMI is >= 25 and <30. (Overweight) The following options were offered after discussion;: exercise counseling/recommendations and nutrition counseling/recommendations           Does the patient have evidence of cognitive impairment? No  Lab Results   Component Value Date    CHLPL 237 (H) 2025    TRIG 302 (H) 2025    HDL 41 2025     (H) 2025    VLDL 55 (H) 2025                                                                                                Health  Risk Assessment    Smoking Status:  Social History     Tobacco Use   Smoking Status Never   Smokeless Tobacco Never     Alcohol Consumption:  Social History     Substance and Sexual Activity   Alcohol Use Yes    Alcohol/week: 1.0 standard drink of alcohol    Types: 1 Cans of beer per week    Comment: rare       Fall Risk Screen  STEADI Fall Risk Assessment was completed, and patient is at LOW risk for falls.Assessment completed on:2025    Depression Screening   Little interest or pleasure in doing things? Not at all   Feeling down, depressed, or hopeless? Not at all   PHQ-2 Total Score 0      Health Habits and Functional and Cognitive Screenin/23/2025     8:11 AM   Functional & Cognitive Status   Do you have difficulty preparing food and eating? No   Do you have difficulty bathing " yourself, getting dressed or grooming yourself? No   Do you have difficulty using the toilet? No   Do you have difficulty moving around from place to place? No   Do you have trouble with steps or getting out of a bed or a chair? No   Current Diet Well Balanced Diet   Dental Exam Up to date   Eye Exam Up to date   Exercise (times per week) 5 times per week   Current Exercises Include Walking   Do you need help using the phone?  No   Are you deaf or do you have serious difficulty hearing?  No   Do you need help to go to places out of walking distance? No   Do you need help shopping? No   Do you need help preparing meals?  No   Do you need help with housework?  No   Do you need help with laundry? No   Do you need help taking your medications? No   Do you need help managing money? No   Do you ever drive or ride in a car without wearing a seat belt? No   Have you felt unusual stress, anger or loneliness in the last month? No   Who do you live with? Alone   If you need help, do you have trouble finding someone available to you? No   Have you been bothered in the last four weeks by sexual problems? No   Do you have difficulty concentrating, remembering or making decisions? No           Age-appropriate Screening Schedule:  Refer to the list below for future screening recommendations based on patient's age, sex and/or medical conditions. Orders for these recommended tests are listed in the plan section. The patient has been provided with a written plan.    Health Maintenance List  Health Maintenance   Topic Date Due    Pneumococcal Vaccine 50+ (1 of 1 - PCV) 11/19/2025 (Originally 10/1/2008)    TDAP/TD VACCINES (1 - Tdap) 11/19/2025 (Originally [birth year]1/1977)    INFLUENZA VACCINE  07/01/2025    ANNUAL WELLNESS VISIT  05/23/2026    LIPID PANEL  05/23/2026    COLORECTAL CANCER SCREENING  07/26/2033    HEPATITIS C SCREENING  Completed    ZOSTER VACCINE  Completed    COVID-19 Vaccine  Discontinued                                      "                                                                                                           CMS Preventative Services Quick Reference  Risk Factors Identified During Encounter  Glaucoma or Family History of Glaucoma:  Ophthalmology Appointment Recommended  Immunizations Discussed/Encouraged: Tdap and Prevnar 20 (Pneumococcal 20-valent conjugate)  Polypharmacy: Medication List reviewed  Dental Screening Recommended  Vision Screening Recommended    The above risks/problems have been discussed with the patient.  Pertinent information has been shared with the patient in the After Visit Summary.  An After Visit Summary and PPPS were made available to the patient.    Follow Up:   Next Medicare Wellness visit to be scheduled in 1 year.         Additional E&M Note during same encounter follows:  Patient has additional, significant, and separately identifiable condition(s)/problem(s) that require work above and beyond the Medicare Wellness Visit     Chief Complaint  Medicare Wellness-Initial Visit    Subjective   HPI  NICK is also being seen today for additional medical problem/s.          Reports arthritis left hand joints and left great toe   Still taking vitamins and fenofibrate  Uses valtrex prn   Viagra as needed, rarely     Considering semaglutide for weight mgmt     Colonoscopy utd 2033        Objective   Vital Signs:  /81 (BP Location: Left arm, Patient Position: Sitting, Cuff Size: Adult)   Pulse 60   Temp 98.2 °F (36.8 °C) (Infrared)   Resp 20   Ht 175.3 cm (69\")   Wt 89.8 kg (198 lb)   SpO2 95%   BMI 29.24 kg/m²   Physical Exam               Assessment and Plan      Medicare annual wellness visit, initial    Orders:    TSH    CBC & Differential    Comprehensive Metabolic Panel    Hyperlipidemia, unspecified hyperlipidemia type       Orders:    Lipid Panel    Vitamin D deficiency    Orders:    Vitamin D 25 hydroxy    Screening PSA (prostate specific antigen)    Orders:    PSA " SCREENING    Hair loss         Overweight          Fasting labs today   Minoxidil for hair loss  Refer to derm  Continue all current treatment            Follow Up   Return in about 1 year (around 5/23/2026) for Medicare Wellness.  Patient was given instructions and counseling regarding his condition or for health maintenance advice. Please see specific information pulled into the AVS if appropriate.

## 2025-05-24 LAB
25(OH)D3+25(OH)D2 SERPL-MCNC: 28.5 NG/ML (ref 30–100)
ALBUMIN SERPL-MCNC: 4.5 G/DL (ref 3.5–5.2)
ALBUMIN/GLOB SERPL: 1.6 G/DL
ALP SERPL-CCNC: 122 U/L (ref 39–117)
ALT SERPL-CCNC: 27 U/L (ref 1–41)
AST SERPL-CCNC: 28 U/L (ref 1–40)
BASOPHILS # BLD AUTO: 0.09 10*3/MM3 (ref 0–0.2)
BASOPHILS NFR BLD AUTO: 1.2 % (ref 0–1.5)
BILIRUB SERPL-MCNC: 0.3 MG/DL (ref 0–1.2)
BUN SERPL-MCNC: 13 MG/DL (ref 8–23)
BUN/CREAT SERPL: 12.6 (ref 7–25)
CALCIUM SERPL-MCNC: 9.9 MG/DL (ref 8.6–10.5)
CHLORIDE SERPL-SCNC: 104 MMOL/L (ref 98–107)
CHOLEST SERPL-MCNC: 237 MG/DL (ref 0–200)
CO2 SERPL-SCNC: 25.4 MMOL/L (ref 22–29)
CREAT SERPL-MCNC: 1.03 MG/DL (ref 0.76–1.27)
EGFRCR SERPLBLD CKD-EPI 2021: 80.1 ML/MIN/1.73
EOSINOPHIL # BLD AUTO: 0.21 10*3/MM3 (ref 0–0.4)
EOSINOPHIL NFR BLD AUTO: 2.8 % (ref 0.3–6.2)
ERYTHROCYTE [DISTWIDTH] IN BLOOD BY AUTOMATED COUNT: 13.6 % (ref 12.3–15.4)
GLOBULIN SER CALC-MCNC: 2.8 GM/DL
GLUCOSE SERPL-MCNC: 99 MG/DL (ref 65–99)
HCT VFR BLD AUTO: 46 % (ref 37.5–51)
HDLC SERPL-MCNC: 41 MG/DL (ref 40–60)
HGB BLD-MCNC: 15.3 G/DL (ref 13–17.7)
IMM GRANULOCYTES # BLD AUTO: 0.01 10*3/MM3 (ref 0–0.05)
IMM GRANULOCYTES NFR BLD AUTO: 0.1 % (ref 0–0.5)
LDLC SERPL CALC-MCNC: 141 MG/DL (ref 0–100)
LYMPHOCYTES # BLD AUTO: 2.34 10*3/MM3 (ref 0.7–3.1)
LYMPHOCYTES NFR BLD AUTO: 31.2 % (ref 19.6–45.3)
MCH RBC QN AUTO: 30.5 PG (ref 26.6–33)
MCHC RBC AUTO-ENTMCNC: 33.3 G/DL (ref 31.5–35.7)
MCV RBC AUTO: 91.8 FL (ref 79–97)
MONOCYTES # BLD AUTO: 0.72 10*3/MM3 (ref 0.1–0.9)
MONOCYTES NFR BLD AUTO: 9.6 % (ref 5–12)
NEUTROPHILS # BLD AUTO: 4.13 10*3/MM3 (ref 1.7–7)
NEUTROPHILS NFR BLD AUTO: 55.1 % (ref 42.7–76)
NRBC BLD AUTO-RTO: 0 /100 WBC (ref 0–0.2)
PLATELET # BLD AUTO: 218 10*3/MM3 (ref 140–450)
POTASSIUM SERPL-SCNC: 4.4 MMOL/L (ref 3.5–5.2)
PROT SERPL-MCNC: 7.3 G/DL (ref 6–8.5)
PSA SERPL-MCNC: 1.66 NG/ML (ref 0–4)
RBC # BLD AUTO: 5.01 10*6/MM3 (ref 4.14–5.8)
SODIUM SERPL-SCNC: 140 MMOL/L (ref 136–145)
TRIGL SERPL-MCNC: 302 MG/DL (ref 0–150)
TSH SERPL DL<=0.005 MIU/L-ACNC: 3.32 UIU/ML (ref 0.27–4.2)
VLDLC SERPL CALC-MCNC: 55 MG/DL (ref 5–40)
WBC # BLD AUTO: 7.5 10*3/MM3 (ref 3.4–10.8)

## 2025-08-28 RX ORDER — FENOFIBRATE 145 MG/1
145 TABLET, FILM COATED ORAL DAILY
Qty: 90 TABLET | Refills: 3 | Status: SHIPPED | OUTPATIENT
Start: 2025-08-28

## 2025-08-28 RX ORDER — SIMVASTATIN 5 MG
5 TABLET ORAL NIGHTLY
Qty: 90 TABLET | Refills: 3 | Status: SHIPPED | OUTPATIENT
Start: 2025-08-28

## (undated) DEVICE — YANKAUER,BULB TIP WITH VENT: Brand: ARGYLE

## (undated) DEVICE — Device: Brand: DEFENDO AIR/WATER/SUCTION AND BIOPSY VALVE

## (undated) DEVICE — MASK,OXYGEN,MED CONC,ADLT,7' TUB, UC: Brand: PENDING

## (undated) DEVICE — THE CHANNEL CLEANING BRUSH IS A NYLON FLEXI BRUSH ATTACHED TO A FLEXIBLE PLASTIC SHEATH DESIGNED TO SAFELY REMOVE DEBRIS FROM FLEXIBLE ENDOSCOPES.

## (undated) DEVICE — SENSR O2 OXIMAX FNGR A/ 18IN NONSTR